# Patient Record
Sex: FEMALE | Race: WHITE | NOT HISPANIC OR LATINO | Employment: FULL TIME | URBAN - METROPOLITAN AREA
[De-identification: names, ages, dates, MRNs, and addresses within clinical notes are randomized per-mention and may not be internally consistent; named-entity substitution may affect disease eponyms.]

---

## 2023-02-20 ENCOUNTER — TRANSCRIBE ORDERS (OUTPATIENT)
Dept: LAB | Facility: HOSPITAL | Age: 53
End: 2023-02-20
Payer: COMMERCIAL

## 2023-02-20 ENCOUNTER — LAB (OUTPATIENT)
Dept: LAB | Facility: HOSPITAL | Age: 53
End: 2023-02-20
Payer: COMMERCIAL

## 2023-02-20 DIAGNOSIS — N92.4 PREMENOPAUSAL MENORRHAGIA: Primary | ICD-10-CM

## 2023-02-20 DIAGNOSIS — N92.4 PREMENOPAUSAL MENORRHAGIA: ICD-10-CM

## 2023-02-20 PROCEDURE — 85027 COMPLETE CBC AUTOMATED: CPT

## 2023-02-20 PROCEDURE — 84439 ASSAY OF FREE THYROXINE: CPT

## 2023-02-20 PROCEDURE — 36415 COLL VENOUS BLD VENIPUNCTURE: CPT

## 2023-02-20 PROCEDURE — 84443 ASSAY THYROID STIM HORMONE: CPT

## 2023-02-21 LAB
DEPRECATED RDW RBC AUTO: 47.8 FL (ref 37–54)
ERYTHROCYTE [DISTWIDTH] IN BLOOD BY AUTOMATED COUNT: 14.4 % (ref 12.3–15.4)
HCT VFR BLD AUTO: 38.5 % (ref 34–46.6)
HGB BLD-MCNC: 12.8 G/DL (ref 12–15.9)
MCH RBC QN AUTO: 30.5 PG (ref 26.6–33)
MCHC RBC AUTO-ENTMCNC: 33.2 G/DL (ref 31.5–35.7)
MCV RBC AUTO: 91.9 FL (ref 79–97)
PLATELET # BLD AUTO: 293 10*3/MM3 (ref 140–450)
PMV BLD AUTO: 10.6 FL (ref 6–12)
RBC # BLD AUTO: 4.19 10*6/MM3 (ref 3.77–5.28)
T4 FREE SERPL-MCNC: 1.09 NG/DL (ref 0.93–1.7)
TSH SERPL DL<=0.05 MIU/L-ACNC: 8.78 UIU/ML (ref 0.27–4.2)
WBC NRBC COR # BLD: 5.29 10*3/MM3 (ref 3.4–10.8)

## 2024-01-31 ENCOUNTER — PREP FOR SURGERY (OUTPATIENT)
Dept: OTHER | Facility: HOSPITAL | Age: 54
End: 2024-01-31
Payer: COMMERCIAL

## 2024-01-31 ENCOUNTER — PRE-ADMISSION TESTING (OUTPATIENT)
Dept: PREADMISSION TESTING | Facility: HOSPITAL | Age: 54
End: 2024-01-31
Payer: COMMERCIAL

## 2024-01-31 VITALS — BODY MASS INDEX: 30.81 KG/M2 | HEIGHT: 64 IN | WEIGHT: 180.45 LBS

## 2024-01-31 DIAGNOSIS — N39.3 SUI (STRESS URINARY INCONTINENCE, FEMALE): ICD-10-CM

## 2024-01-31 DIAGNOSIS — N39.3 SUI (STRESS URINARY INCONTINENCE, FEMALE): Primary | ICD-10-CM

## 2024-01-31 LAB
BASOPHILS # BLD AUTO: 0.03 10*3/MM3 (ref 0–0.2)
BASOPHILS NFR BLD AUTO: 0.5 % (ref 0–1.5)
BILIRUB UR QL STRIP: NEGATIVE
CLARITY UR: CLEAR
COLOR UR: YELLOW
DEPRECATED RDW RBC AUTO: 50.1 FL (ref 37–54)
EOSINOPHIL # BLD AUTO: 0.13 10*3/MM3 (ref 0–0.4)
EOSINOPHIL NFR BLD AUTO: 2.4 % (ref 0.3–6.2)
ERYTHROCYTE [DISTWIDTH] IN BLOOD BY AUTOMATED COUNT: 14.3 % (ref 12.3–15.4)
GLUCOSE UR STRIP-MCNC: NEGATIVE MG/DL
HCT VFR BLD AUTO: 41.1 % (ref 34–46.6)
HGB BLD-MCNC: 13.5 G/DL (ref 12–15.9)
HGB UR QL STRIP.AUTO: NEGATIVE
IMM GRANULOCYTES # BLD AUTO: 0.01 10*3/MM3 (ref 0–0.05)
IMM GRANULOCYTES NFR BLD AUTO: 0.2 % (ref 0–0.5)
KETONES UR QL STRIP: NEGATIVE
LEUKOCYTE ESTERASE UR QL STRIP.AUTO: NEGATIVE
LYMPHOCYTES # BLD AUTO: 1.44 10*3/MM3 (ref 0.7–3.1)
LYMPHOCYTES NFR BLD AUTO: 26.1 % (ref 19.6–45.3)
MCH RBC QN AUTO: 31.8 PG (ref 26.6–33)
MCHC RBC AUTO-ENTMCNC: 32.8 G/DL (ref 31.5–35.7)
MCV RBC AUTO: 96.7 FL (ref 79–97)
MONOCYTES # BLD AUTO: 0.42 10*3/MM3 (ref 0.1–0.9)
MONOCYTES NFR BLD AUTO: 7.6 % (ref 5–12)
NEUTROPHILS NFR BLD AUTO: 3.49 10*3/MM3 (ref 1.7–7)
NEUTROPHILS NFR BLD AUTO: 63.2 % (ref 42.7–76)
NITRITE UR QL STRIP: NEGATIVE
NRBC BLD AUTO-RTO: 0 /100 WBC (ref 0–0.2)
PH UR STRIP.AUTO: 6.5 [PH] (ref 5–8)
PLATELET # BLD AUTO: 272 10*3/MM3 (ref 140–450)
PMV BLD AUTO: 10 FL (ref 6–12)
POTASSIUM SERPL-SCNC: 4.7 MMOL/L (ref 3.5–5.2)
PROT UR QL STRIP: NEGATIVE
RBC # BLD AUTO: 4.25 10*6/MM3 (ref 3.77–5.28)
SP GR UR STRIP: 1.01 (ref 1–1.03)
UROBILINOGEN UR QL STRIP: NORMAL
WBC NRBC COR # BLD AUTO: 5.52 10*3/MM3 (ref 3.4–10.8)

## 2024-01-31 PROCEDURE — 84132 ASSAY OF SERUM POTASSIUM: CPT

## 2024-01-31 PROCEDURE — 36415 COLL VENOUS BLD VENIPUNCTURE: CPT

## 2024-01-31 PROCEDURE — 85025 COMPLETE CBC W/AUTO DIFF WBC: CPT

## 2024-01-31 PROCEDURE — 81003 URINALYSIS AUTO W/O SCOPE: CPT

## 2024-01-31 RX ORDER — SODIUM CHLORIDE 9 MG/ML
40 INJECTION, SOLUTION INTRAVENOUS AS NEEDED
OUTPATIENT
Start: 2024-01-31

## 2024-01-31 RX ORDER — FOLIC ACID 1 MG/1
4-5 TABLET ORAL DAILY
COMMUNITY

## 2024-01-31 RX ORDER — SPIRONOLACTONE 100 MG/1
100 TABLET, FILM COATED ORAL DAILY
COMMUNITY

## 2024-01-31 RX ORDER — LEVOCETIRIZINE DIHYDROCHLORIDE 5 MG/1
5 TABLET, FILM COATED ORAL EVERY EVENING
COMMUNITY

## 2024-01-31 RX ORDER — ACETAMINOPHEN 500 MG
1000 TABLET ORAL ONCE
OUTPATIENT
Start: 2024-01-31 | End: 2024-01-31

## 2024-01-31 RX ORDER — SODIUM CHLORIDE 0.9 % (FLUSH) 0.9 %
3 SYRINGE (ML) INJECTION EVERY 12 HOURS SCHEDULED
OUTPATIENT
Start: 2024-01-31

## 2024-01-31 RX ORDER — METHOTREXATE 2.5 MG/1
TABLET ORAL WEEKLY
COMMUNITY

## 2024-01-31 RX ORDER — BISACODYL 10 MG
10 SUPPOSITORY, RECTAL RECTAL DAILY PRN
COMMUNITY

## 2024-01-31 RX ORDER — LEVOTHYROXINE SODIUM 0.03 MG/1
25 TABLET ORAL
COMMUNITY

## 2024-01-31 RX ORDER — GABAPENTIN 300 MG/1
600 CAPSULE ORAL ONCE
OUTPATIENT
Start: 2024-01-31 | End: 2024-01-31

## 2024-01-31 RX ORDER — HEPARIN SODIUM 5000 [USP'U]/ML
5000 INJECTION, SOLUTION INTRAVENOUS; SUBCUTANEOUS ONCE
OUTPATIENT
Start: 2024-01-31 | End: 2024-01-31

## 2024-01-31 RX ORDER — SODIUM CHLORIDE 0.9 % (FLUSH) 0.9 %
10 SYRINGE (ML) INJECTION AS NEEDED
OUTPATIENT
Start: 2024-01-31

## 2024-01-31 NOTE — PAT
Patient viewed general PAT education video as instructed in their preoperative information received from their surgeon.  Patient stated the general PAT education video was viewed in its entirety and survey completed.  Copies of [facility] general education handouts (Incentive Spirometry, Meds to Beds Program, Patient Belongings, Pre-op skin preparation instructions, Blood Glucose testing, Visitor policy, Surgery FAQ, Code H) distributed to patient if not printed. Education related to the PAT pass and skin preparation for surgery (if applicable) completed in PAT as a reinforcement to PAT education video. Patient instructed to return PAT pass provided today as well as completed skin preparation sheet (if applicable) on the day of procedure.     Additionally if patient had not viewed video yet but intended to view it at home or in our waiting area, then referred them to the handout with QR code/link provided during PAT visit.  Instructed patient to complete survey after viewing the video in its entirety.  Encouraged patient/family to read [facility] general education handouts thoroughly and notify PAT staff with any questions or concerns. Patient verbalized understanding of all information and priority content.    Patient to apply Chlorhexadine wipes  to surgical area (as instructed) the night before procedure and the AM of procedure. Wipes provided.    Patient instructed to drink 20 ounces of Gatorade or Gatorlyte (if diabetic) and it needs to be completed 1 hour (for Main OR patients) or 2 hours (scheduled  section & [facility] patients) before given arrival time for procedure (NO RED Gatorade and NO Gatorade Zero).    Patient verbalized understanding.    Instructed patient to take two Tylenol extra strength (total of 1000 mg) the night before surgery.

## 2024-02-07 ENCOUNTER — ANESTHESIA EVENT (OUTPATIENT)
Dept: PERIOP | Facility: HOSPITAL | Age: 54
End: 2024-02-07
Payer: COMMERCIAL

## 2024-02-07 RX ORDER — FAMOTIDINE 10 MG/ML
20 INJECTION, SOLUTION INTRAVENOUS ONCE
Status: CANCELLED | OUTPATIENT
Start: 2024-02-07 | End: 2024-02-07

## 2024-02-07 RX ORDER — SODIUM CHLORIDE 9 MG/ML
40 INJECTION, SOLUTION INTRAVENOUS AS NEEDED
Status: CANCELLED | OUTPATIENT
Start: 2024-02-07

## 2024-02-07 RX ORDER — SODIUM CHLORIDE 0.9 % (FLUSH) 0.9 %
10 SYRINGE (ML) INJECTION AS NEEDED
Status: CANCELLED | OUTPATIENT
Start: 2024-02-07

## 2024-02-08 ENCOUNTER — ANESTHESIA (OUTPATIENT)
Dept: PERIOP | Facility: HOSPITAL | Age: 54
End: 2024-02-08
Payer: COMMERCIAL

## 2024-02-08 ENCOUNTER — HOSPITAL ENCOUNTER (OUTPATIENT)
Facility: HOSPITAL | Age: 54
Discharge: HOME | End: 2024-02-08
Attending: OBSTETRICS & GYNECOLOGY | Admitting: OBSTETRICS & GYNECOLOGY
Payer: COMMERCIAL

## 2024-02-08 VITALS
HEART RATE: 82 BPM | BODY MASS INDEX: 30.81 KG/M2 | TEMPERATURE: 97.9 F | HEIGHT: 64 IN | OXYGEN SATURATION: 95 % | SYSTOLIC BLOOD PRESSURE: 118 MMHG | WEIGHT: 180.45 LBS | RESPIRATION RATE: 14 BRPM | DIASTOLIC BLOOD PRESSURE: 75 MMHG

## 2024-02-08 DIAGNOSIS — N39.3 SUI (STRESS URINARY INCONTINENCE, FEMALE): ICD-10-CM

## 2024-02-08 LAB
B-HCG UR QL: NEGATIVE
EXPIRATION DATE: NORMAL
INTERNAL NEGATIVE CONTROL: NEGATIVE
INTERNAL POSITIVE CONTROL: POSITIVE
Lab: NORMAL
POTASSIUM SERPL-SCNC: 4.8 MMOL/L (ref 3.5–5.2)

## 2024-02-08 PROCEDURE — 25810000003 LACTATED RINGERS PER 1000 ML: Performed by: ANESTHESIOLOGY

## 2024-02-08 PROCEDURE — 81025 URINE PREGNANCY TEST: CPT | Performed by: ANESTHESIOLOGY

## 2024-02-08 PROCEDURE — 25010000002 HEPARIN (PORCINE) PER 1000 UNITS: Performed by: OBSTETRICS & GYNECOLOGY

## 2024-02-08 PROCEDURE — 25010000002 PROPOFOL 10 MG/ML EMULSION

## 2024-02-08 PROCEDURE — 25010000002 ESMOLOL 100 MG/10ML SOLUTION: Performed by: ANESTHESIOLOGY

## 2024-02-08 PROCEDURE — 25010000002 ONDANSETRON PER 1 MG: Performed by: ANESTHESIOLOGY

## 2024-02-08 PROCEDURE — C1771 REP DEV, URINARY, W/SLING: HCPCS | Performed by: OBSTETRICS & GYNECOLOGY

## 2024-02-08 PROCEDURE — 25010000002 FENTANYL CITRATE (PF) 100 MCG/2ML SOLUTION

## 2024-02-08 PROCEDURE — 25010000002 DEXAMETHASONE PER 1 MG: Performed by: ANESTHESIOLOGY

## 2024-02-08 PROCEDURE — 84132 ASSAY OF SERUM POTASSIUM: CPT | Performed by: OBSTETRICS & GYNECOLOGY

## 2024-02-08 PROCEDURE — G0378 HOSPITAL OBSERVATION PER HR: HCPCS

## 2024-02-08 PROCEDURE — 25010000002 CEFAZOLIN PER 500 MG: Performed by: OBSTETRICS & GYNECOLOGY

## 2024-02-08 PROCEDURE — 25810000003 SODIUM CHLORIDE PER 500 ML: Performed by: OBSTETRICS & GYNECOLOGY

## 2024-02-08 DEVICE — IMPLANTABLE DEVICE: Type: IMPLANTABLE DEVICE | Site: VAGINA | Status: FUNCTIONAL

## 2024-02-08 RX ORDER — DEXAMETHASONE SODIUM PHOSPHATE 4 MG/ML
INJECTION, SOLUTION INTRA-ARTICULAR; INTRALESIONAL; INTRAMUSCULAR; INTRAVENOUS; SOFT TISSUE AS NEEDED
Status: DISCONTINUED | OUTPATIENT
Start: 2024-02-08 | End: 2024-02-08 | Stop reason: SURG

## 2024-02-08 RX ORDER — HEPARIN SODIUM 5000 [USP'U]/ML
5000 INJECTION, SOLUTION INTRAVENOUS; SUBCUTANEOUS ONCE
Status: DISCONTINUED | OUTPATIENT
Start: 2024-02-08 | End: 2024-02-08 | Stop reason: HOSPADM

## 2024-02-08 RX ORDER — LIDOCAINE HYDROCHLORIDE 10 MG/ML
0.5 INJECTION, SOLUTION EPIDURAL; INFILTRATION; INTRACAUDAL; PERINEURAL ONCE AS NEEDED
Status: COMPLETED | OUTPATIENT
Start: 2024-02-08 | End: 2024-02-08

## 2024-02-08 RX ORDER — SODIUM CHLORIDE 0.9 % (FLUSH) 0.9 %
10 SYRINGE (ML) INJECTION EVERY 12 HOURS SCHEDULED
Status: DISCONTINUED | OUTPATIENT
Start: 2024-02-08 | End: 2024-02-08 | Stop reason: HOSPADM

## 2024-02-08 RX ORDER — SODIUM CHLORIDE, SODIUM LACTATE, POTASSIUM CHLORIDE, CALCIUM CHLORIDE 600; 310; 30; 20 MG/100ML; MG/100ML; MG/100ML; MG/100ML
9 INJECTION, SOLUTION INTRAVENOUS CONTINUOUS
Status: DISCONTINUED | OUTPATIENT
Start: 2024-02-08 | End: 2024-02-08 | Stop reason: HOSPADM

## 2024-02-08 RX ORDER — HYDROMORPHONE HYDROCHLORIDE 1 MG/ML
0.5 INJECTION, SOLUTION INTRAMUSCULAR; INTRAVENOUS; SUBCUTANEOUS
Status: DISCONTINUED | OUTPATIENT
Start: 2024-02-08 | End: 2024-02-08 | Stop reason: HOSPADM

## 2024-02-08 RX ORDER — MIDAZOLAM HYDROCHLORIDE 1 MG/ML
1 INJECTION INTRAMUSCULAR; INTRAVENOUS
Status: DISCONTINUED | OUTPATIENT
Start: 2024-02-08 | End: 2024-02-08 | Stop reason: HOSPADM

## 2024-02-08 RX ORDER — HEPARIN SODIUM 5000 [USP'U]/ML
INJECTION, SOLUTION INTRAVENOUS; SUBCUTANEOUS AS NEEDED
Status: DISCONTINUED | OUTPATIENT
Start: 2024-02-08 | End: 2024-02-08 | Stop reason: HOSPADM

## 2024-02-08 RX ORDER — ONDANSETRON 2 MG/ML
4 INJECTION INTRAMUSCULAR; INTRAVENOUS ONCE AS NEEDED
Status: DISCONTINUED | OUTPATIENT
Start: 2024-02-08 | End: 2024-02-08 | Stop reason: HOSPADM

## 2024-02-08 RX ORDER — ONDANSETRON 2 MG/ML
INJECTION INTRAMUSCULAR; INTRAVENOUS AS NEEDED
Status: DISCONTINUED | OUTPATIENT
Start: 2024-02-08 | End: 2024-02-08 | Stop reason: SURG

## 2024-02-08 RX ORDER — LIDOCAINE HYDROCHLORIDE 10 MG/ML
INJECTION, SOLUTION EPIDURAL; INFILTRATION; INTRACAUDAL; PERINEURAL AS NEEDED
Status: DISCONTINUED | OUTPATIENT
Start: 2024-02-08 | End: 2024-02-08 | Stop reason: SURG

## 2024-02-08 RX ORDER — GABAPENTIN 300 MG/1
600 CAPSULE ORAL ONCE
Status: COMPLETED | OUTPATIENT
Start: 2024-02-08 | End: 2024-02-08

## 2024-02-08 RX ORDER — FENTANYL CITRATE 50 UG/ML
INJECTION, SOLUTION INTRAMUSCULAR; INTRAVENOUS AS NEEDED
Status: DISCONTINUED | OUTPATIENT
Start: 2024-02-08 | End: 2024-02-08 | Stop reason: SURG

## 2024-02-08 RX ORDER — FENTANYL CITRATE 50 UG/ML
50 INJECTION, SOLUTION INTRAMUSCULAR; INTRAVENOUS
Status: DISCONTINUED | OUTPATIENT
Start: 2024-02-08 | End: 2024-02-08 | Stop reason: HOSPADM

## 2024-02-08 RX ORDER — SODIUM CHLORIDE 9 MG/ML
INJECTION, SOLUTION INTRAVENOUS AS NEEDED
Status: DISCONTINUED | OUTPATIENT
Start: 2024-02-08 | End: 2024-02-08 | Stop reason: HOSPADM

## 2024-02-08 RX ORDER — PROPOFOL 10 MG/ML
VIAL (ML) INTRAVENOUS AS NEEDED
Status: DISCONTINUED | OUTPATIENT
Start: 2024-02-08 | End: 2024-02-08 | Stop reason: SURG

## 2024-02-08 RX ORDER — FAMOTIDINE 20 MG/1
20 TABLET, FILM COATED ORAL ONCE
Status: COMPLETED | OUTPATIENT
Start: 2024-02-08 | End: 2024-02-08

## 2024-02-08 RX ORDER — MELOXICAM 15 MG/1
7.5 TABLET ORAL ONCE AS NEEDED
Status: DISCONTINUED | OUTPATIENT
Start: 2024-02-08 | End: 2024-02-08 | Stop reason: HOSPADM

## 2024-02-08 RX ORDER — ESMOLOL HYDROCHLORIDE 10 MG/ML
INJECTION INTRAVENOUS AS NEEDED
Status: DISCONTINUED | OUTPATIENT
Start: 2024-02-08 | End: 2024-02-08 | Stop reason: SURG

## 2024-02-08 RX ORDER — ACETAMINOPHEN 325 MG/1
650 TABLET ORAL ONCE
Status: DISCONTINUED | OUTPATIENT
Start: 2024-02-08 | End: 2024-02-08 | Stop reason: HOSPADM

## 2024-02-08 RX ORDER — ACETAMINOPHEN 500 MG
1000 TABLET ORAL ONCE
Status: COMPLETED | OUTPATIENT
Start: 2024-02-08 | End: 2024-02-08

## 2024-02-08 RX ADMIN — ACETAMINOPHEN 1000 MG: 500 TABLET ORAL at 11:50

## 2024-02-08 RX ADMIN — ONDANSETRON 4 MG: 2 INJECTION INTRAMUSCULAR; INTRAVENOUS at 13:35

## 2024-02-08 RX ADMIN — FAMOTIDINE 20 MG: 20 TABLET, FILM COATED ORAL at 11:50

## 2024-02-08 RX ADMIN — FENTANYL CITRATE 50 MCG: 50 INJECTION, SOLUTION INTRAMUSCULAR; INTRAVENOUS at 13:14

## 2024-02-08 RX ADMIN — LIDOCAINE HYDROCHLORIDE 50 MG: 10 INJECTION, SOLUTION EPIDURAL; INFILTRATION; INTRACAUDAL; PERINEURAL at 13:14

## 2024-02-08 RX ADMIN — SODIUM CHLORIDE, POTASSIUM CHLORIDE, SODIUM LACTATE AND CALCIUM CHLORIDE 9 ML/HR: 600; 310; 30; 20 INJECTION, SOLUTION INTRAVENOUS at 11:50

## 2024-02-08 RX ADMIN — PROPOFOL 30 MG: 10 INJECTION, EMULSION INTRAVENOUS at 13:22

## 2024-02-08 RX ADMIN — LIDOCAINE HYDROCHLORIDE 0.5 ML: 10 INJECTION, SOLUTION EPIDURAL; INFILTRATION; INTRACAUDAL; PERINEURAL at 11:50

## 2024-02-08 RX ADMIN — DEXAMETHASONE SODIUM PHOSPHATE 8 MG: 4 INJECTION, SOLUTION INTRAMUSCULAR; INTRAVENOUS at 13:24

## 2024-02-08 RX ADMIN — SODIUM CHLORIDE 2000 MG: 900 INJECTION INTRAVENOUS at 13:21

## 2024-02-08 RX ADMIN — GABAPENTIN 600 MG: 300 CAPSULE ORAL at 11:50

## 2024-02-08 RX ADMIN — PROPOFOL 200 MG: 10 INJECTION, EMULSION INTRAVENOUS at 13:14

## 2024-02-08 RX ADMIN — FENTANYL CITRATE 50 MCG: 50 INJECTION, SOLUTION INTRAMUSCULAR; INTRAVENOUS at 13:20

## 2024-02-08 RX ADMIN — ESMOLOL HYDROCHLORIDE 30 MG: 10 INJECTION, SOLUTION INTRAVENOUS at 13:19

## 2024-02-08 NOTE — OP NOTE
Operative Procedure Report    Pre-operative Diagnosis: GSUI    Post-operative Diagnosis: GSUI    Operation: Midurethral Sling, Cystoscopy     Surgeon: Melanie Burgos MD     Assistants: none    Anesthesia: General endotracheal anesthesia    Findings: Hypermobile urethra    Estimated Blood Loss: Minimal      Specimens: None    Implants: midurethral sling    Complications:  None; patient tolerated the procedure well.    Disposition: PACU - hemodynamically stable.    Condition: stable      Procedure Details    The patient was seen in the Holding Room. The risks, benefits, complications, treatment options, and expected outcomes were discussed with the patient. The patient concurred with the proposed plan, giving informed consent. The patient wad identified as Yue Jernigan and the procedure verified as Midurethral Sling with Cystoscopy. A Time Out was held and the above information confirmed.  After induction of anesthesia, the patient was draped and prepped in the usual sterile manner. The vaginal epithelium over the proximal and distal ends of the urethra was identified and grasped with allis clamps. 10ml of dilute marcaine solution was injected in the epithelium for hydro dissection. A 1-2 cm vertical midline incision was performed and the vaginal epithelium was dissected away from the urethra and bladder. The sling was then inserted and secured to the left obturator fascia. It was then reloaded and inserted to the right obturator fascia under appropriate tensioning. The overlying vaginal epithelium was closed with 3-0 vicryl. Diagnostic cystoscopy was then performed. Bladder and urethral integrity were confirmed. The counts were correct x 2 and the patient was awakened on the table. She was taken to RR in stable condition.       Melanie Burgos MD  02/08/24  13:44 EST

## 2024-02-08 NOTE — ANESTHESIA PREPROCEDURE EVALUATION
Anesthesia Evaluation     Patient summary reviewed and Nursing notes reviewed                Airway   Mallampati: II  TM distance: >3 FB  Neck ROM: full  No difficulty expected  Dental - normal exam     Pulmonary - negative pulmonary ROS and normal exam   Cardiovascular - negative cardio ROS and normal exam        Neuro/Psych- negative ROS  GI/Hepatic/Renal/Endo - negative ROS   (+) obesity, thyroid problem hypothyroidism    Musculoskeletal (-) negative ROS    Abdominal  - normal exam    Bowel sounds: normal.   Substance History - negative use     OB/GYN negative ob/gyn ROS         Other                      Anesthesia Plan    ASA 2     general     intravenous induction     Anesthetic plan, risks, benefits, and alternatives have been provided, discussed and informed consent has been obtained with: patient.    Plan discussed with CRNA.    CODE STATUS:

## 2024-02-08 NOTE — INTERVAL H&P NOTE
"[facility] Pre-op    Full history and physical note from office is attached.    /83 (BP Location: Right arm)   Pulse 97   Temp 97.2 °F (36.2 °C) (Temporal)   Resp 18   Ht 162.6 cm (64\")   Wt 81.8 kg (180 lb 7.1 oz)   SpO2 99%   BMI 30.97 kg/m²     Review of Systems:  Constitutional-- No fever, chills or sweats. No fatigue.  CV-- No chest pain, palpitation or syncope  Resp-- No SOB, cough, hemoptysis  Skin--No rashes or lesions    LAB Results:  Lab Results   Component Value Date    WBC 5.52 01/31/2024    HGB 13.5 01/31/2024    HCT 41.1 01/31/2024    MCV 96.7 01/31/2024     01/31/2024    NEUTROABS 3.49 01/31/2024    K 4.7 01/31/2024       Cancer Staging (if applicable)  Cancer Patient: __ yes __no __unknown__N/A; If yes, clinical stage T:__ N:__M:__, stage group or __N/A      Impression: stress urinary incontinence       Plan: MID-URETHRAL SLING       Mag Gaines, BREANNA   2/8/2024   11:45 EST  "

## 2024-02-08 NOTE — ANESTHESIA POSTPROCEDURE EVALUATION
Patient: Yue Jernigan    Procedure Summary       Date: 02/08/24 Room / Location:  [facility] OR  /  [facility]    Anesthesia Start: 1311 Anesthesia Stop: 1359    Procedure: MID-URETHRAL SLING (Vagina) Diagnosis:     Surgeons: Melanie Burgos MD Provider: Solomon Coreas MD    Anesthesia Type: general ASA Status: 2            Anesthesia Type: general    Vitals  Vitals Value Taken Time   /75 02/08/24 1530   Temp 97.9 °F (36.6 °C) 02/08/24 1530   Pulse 88 02/08/24 1538   Resp 14 02/08/24 1530   SpO2 94 % 02/08/24 1538   Vitals shown include unfiled device data.        Post Anesthesia Care and Evaluation    Patient location during evaluation: PACU  Patient participation: complete - patient participated  Level of consciousness: awake and alert  Pain management: adequate    Airway patency: patent  Anesthetic complications: No anesthetic complications  PONV Status: none  Cardiovascular status: hemodynamically stable and acceptable  Respiratory status: nonlabored ventilation, acceptable and nasal cannula  Hydration status: acceptable

## 2024-02-08 NOTE — ANESTHESIA PROCEDURE NOTES
Airway  Urgency: elective    Date/Time: 2/8/2024 1:17 PM  Airway not difficult    General Information and Staff    Patient location during procedure: OR    Indications and Patient Condition  Indications for airway management: airway protection    Preoxygenated: yes  Mask difficulty assessment: 1 - vent by mask    Final Airway Details  Final airway type: supraglottic airway      Successful airway: I-gel  Size 4     Number of attempts at approach: 1  Assessment: lips, teeth, and gum same as pre-op    Additional Comments  LMA placed without difficulty, ventilation with assist, equal breath sounds and symmetric chest rise and fall          
17-Oct-2021

## 2024-02-08 NOTE — ANESTHESIA POSTPROCEDURE EVALUATION
Patient: Yue Jernigan    Procedure Summary       Date: 02/08/24 Room / Location:  [facility]  /  [facility]    Anesthesia Start: 1311 Anesthesia Stop: 1359    Procedure: MID-URETHRAL SLING (Vagina) Diagnosis:     Surgeons: Melanie Burgos MD Provider: Solomon Coreas MD    Anesthesia Type: general ASA Status: 2            Anesthesia Type: general    Vitals  No vitals data found for the desired time range.          Post Anesthesia Care and Evaluation    Patient location during evaluation: PACU  Patient participation: complete - patient participated  Level of consciousness: awake and alert  Pain score: 0  Pain management: adequate    Airway patency: patent  Anesthetic complications: No anesthetic complications  PONV Status: none  Cardiovascular status: hemodynamically stable and acceptable  Respiratory status: nonlabored ventilation, acceptable and nasal cannula  Hydration status: acceptable

## 2024-02-12 NOTE — DISCHARGE SUMMARY
[facility]  Post Operative Discharge Summary      Patient: Yue Jernigan        MR#:[id number]    Admission  Diagnosis: Urinary, incontinence, stress female  Discharge Diagnosis:   Urinary, incontinence, stress female       Date of Admission: 2/8/2024  Date of Discharge:  2/8/2024     Procedures:  midurethral sling     Service:  Gynecology    Hospital Course:  Patient underwent  and remained in the hospital for 0 days.  During that time she remained afebrile and hemodynamically stable.  On the day of discharge, she was eating, ambulating and voiding without difficulty.      Labs:    Lab Results (last 24 hours)       Procedure Component Value Units Date/Time    Potassium [[id number]]  (Normal) Collected: 02/08/24 1241    Specimen: Blood Updated: 02/08/24 1257     Potassium 4.8 mmol/L     POC Urine Pregnancy [[id number]] Collected: 02/08/24 1154    Specimen: Urine Updated: 02/08/24 1155     HCG, Urine, QL Negative     Lot Number [id number]     Internal Positive Control Positive     Internal Negative Control Negative     Expiration Date 5-2-25            Discharge Medications     Discharge Medications        Continue These Medications        Instructions Start Date   bisacodyl 10 MG suppository  Commonly known as: DULCOLAX   10 mg, Rectal, Daily PRN      folic acid 1 MG tablet  Commonly known as: FOLVITE   4-5 mg, Oral, Daily, TAKE 4-5 TABLETS EVERYDAY EXCEPT THE DAY OF METHOTREXATE (SUNDAY)       levocetirizine 5 MG tablet  Commonly known as: XYZAL   5 mg, Oral, Every Evening      levothyroxine 25 MCG tablet  Commonly known as: SYNTHROID, LEVOTHROID   25 mcg, Oral, Every Early Morning      linaclotide 290 MCG capsule capsule  Commonly known as: LINZESS   290 mcg, Oral, Daily PRN      methotrexate 2.5 MG tablet   Oral, Weekly, 8 TABLETS ON SUNDAYS      spironolactone 100 MG tablet  Commonly known as: ALDACTONE   100 mg, Oral, Daily, FOR ACNE               Discharge Disposition:  To Home    Discharge Condition:   Stable    Discharge Diet: regular    Activity at Discharge: pelvic rest    Follow-up Appointments  4 weeks    Melanie Burgos MD  02/12/24  08:57 EST

## 2024-07-12 ENCOUNTER — LAB (OUTPATIENT)
Facility: HOSPITAL | Age: 54
End: 2024-07-12
Payer: COMMERCIAL

## 2024-07-12 ENCOUNTER — OFFICE VISIT (OUTPATIENT)
Age: 54
End: 2024-07-12
Payer: COMMERCIAL

## 2024-07-12 VITALS
HEART RATE: 100 BPM | WEIGHT: 181.5 LBS | HEIGHT: 64 IN | SYSTOLIC BLOOD PRESSURE: 120 MMHG | BODY MASS INDEX: 30.99 KG/M2 | TEMPERATURE: 98 F | DIASTOLIC BLOOD PRESSURE: 70 MMHG

## 2024-07-12 DIAGNOSIS — L40.50 PSORIATIC ARTHRITIS: Primary | Chronic | ICD-10-CM

## 2024-07-12 DIAGNOSIS — M15.9 PRIMARY OSTEOARTHRITIS INVOLVING MULTIPLE JOINTS: Chronic | ICD-10-CM

## 2024-07-12 DIAGNOSIS — Z79.899 IMMUNODEFICIENCY DUE TO DRUG THERAPY: Chronic | ICD-10-CM

## 2024-07-12 DIAGNOSIS — L40.50 PSORIATIC ARTHRITIS: Chronic | ICD-10-CM

## 2024-07-12 DIAGNOSIS — D84.821 IMMUNODEFICIENCY DUE TO DRUG THERAPY: Chronic | ICD-10-CM

## 2024-07-12 DIAGNOSIS — Z79.899 HIGH RISK MEDICATION USE: Chronic | ICD-10-CM

## 2024-07-12 PROBLEM — M15.0 PRIMARY OSTEOARTHRITIS INVOLVING MULTIPLE JOINTS: Chronic | Status: ACTIVE | Noted: 2024-07-12

## 2024-07-12 PROBLEM — E03.9 HYPOTHYROIDISM: Status: ACTIVE | Noted: 2024-07-12

## 2024-07-12 PROBLEM — M54.9 BACK PAIN: Status: ACTIVE | Noted: 2024-07-12

## 2024-07-12 PROBLEM — L40.9 PSORIASIS: Status: ACTIVE | Noted: 2024-07-12

## 2024-07-12 PROBLEM — Z79.631 METHOTREXATE, LONG TERM, CURRENT USE: Status: ACTIVE | Noted: 2024-07-12

## 2024-07-12 LAB
ALBUMIN SERPL-MCNC: 4.1 G/DL (ref 3.5–5.2)
ALBUMIN/GLOB SERPL: 1.7 G/DL
ALP SERPL-CCNC: 127 U/L (ref 39–117)
ALT SERPL W P-5'-P-CCNC: 14 U/L (ref 1–33)
ANION GAP SERPL CALCULATED.3IONS-SCNC: 10.2 MMOL/L (ref 5–15)
AST SERPL-CCNC: 20 U/L (ref 1–32)
BASOPHILS # BLD AUTO: 0.05 10*3/MM3 (ref 0–0.2)
BASOPHILS NFR BLD AUTO: 0.8 % (ref 0–1.5)
BILIRUB SERPL-MCNC: 0.4 MG/DL (ref 0–1.2)
BUN SERPL-MCNC: 14 MG/DL (ref 6–20)
BUN/CREAT SERPL: 18.2 (ref 7–25)
CALCIUM SPEC-SCNC: 9.2 MG/DL (ref 8.6–10.5)
CHLORIDE SERPL-SCNC: 103 MMOL/L (ref 98–107)
CO2 SERPL-SCNC: 24.8 MMOL/L (ref 22–29)
CREAT SERPL-MCNC: 0.77 MG/DL (ref 0.57–1)
CRP SERPL-MCNC: <0.3 MG/DL (ref 0–0.5)
DEPRECATED RDW RBC AUTO: 46 FL (ref 37–54)
EGFRCR SERPLBLD CKD-EPI 2021: 91.2 ML/MIN/1.73
EOSINOPHIL # BLD AUTO: 0.12 10*3/MM3 (ref 0–0.4)
EOSINOPHIL NFR BLD AUTO: 1.8 % (ref 0.3–6.2)
ERYTHROCYTE [DISTWIDTH] IN BLOOD BY AUTOMATED COUNT: 14 % (ref 12.3–15.4)
ERYTHROCYTE [SEDIMENTATION RATE] IN BLOOD: 4 MM/HR (ref 0–30)
GLOBULIN UR ELPH-MCNC: 2.4 GM/DL
GLUCOSE SERPL-MCNC: 79 MG/DL (ref 65–99)
HCT VFR BLD AUTO: 39.9 % (ref 34–46.6)
HGB BLD-MCNC: 13.1 G/DL (ref 12–15.9)
IMM GRANULOCYTES # BLD AUTO: 0.02 10*3/MM3 (ref 0–0.05)
IMM GRANULOCYTES NFR BLD AUTO: 0.3 % (ref 0–0.5)
LYMPHOCYTES # BLD AUTO: 1.46 10*3/MM3 (ref 0.7–3.1)
LYMPHOCYTES NFR BLD AUTO: 22.1 % (ref 19.6–45.3)
MCH RBC QN AUTO: 30.5 PG (ref 26.6–33)
MCHC RBC AUTO-ENTMCNC: 32.8 G/DL (ref 31.5–35.7)
MCV RBC AUTO: 92.8 FL (ref 79–97)
MONOCYTES # BLD AUTO: 0.64 10*3/MM3 (ref 0.1–0.9)
MONOCYTES NFR BLD AUTO: 9.7 % (ref 5–12)
NEUTROPHILS NFR BLD AUTO: 4.32 10*3/MM3 (ref 1.7–7)
NEUTROPHILS NFR BLD AUTO: 65.3 % (ref 42.7–76)
NRBC BLD AUTO-RTO: 0 /100 WBC (ref 0–0.2)
PLATELET # BLD AUTO: 284 10*3/MM3 (ref 140–450)
PMV BLD AUTO: 10.6 FL (ref 6–12)
POTASSIUM SERPL-SCNC: 4 MMOL/L (ref 3.5–5.2)
PROT SERPL-MCNC: 6.5 G/DL (ref 6–8.5)
RBC # BLD AUTO: 4.3 10*6/MM3 (ref 3.77–5.28)
SODIUM SERPL-SCNC: 138 MMOL/L (ref 136–145)
WBC NRBC COR # BLD AUTO: 6.61 10*3/MM3 (ref 3.4–10.8)

## 2024-07-12 PROCEDURE — 80053 COMPREHEN METABOLIC PANEL: CPT

## 2024-07-12 PROCEDURE — 36415 COLL VENOUS BLD VENIPUNCTURE: CPT

## 2024-07-12 PROCEDURE — 85025 COMPLETE CBC W/AUTO DIFF WBC: CPT

## 2024-07-12 PROCEDURE — 85652 RBC SED RATE AUTOMATED: CPT

## 2024-07-12 PROCEDURE — 86140 C-REACTIVE PROTEIN: CPT

## 2024-07-12 RX ORDER — SECUKINUMAB 150 MG/ML
150 INJECTION SUBCUTANEOUS
Qty: 1.96 ML | Refills: 4 | Status: SHIPPED | OUTPATIENT
Start: 2024-07-12 | End: 2024-07-12

## 2024-07-12 RX ORDER — PANTOPRAZOLE SODIUM 40 MG/1
TABLET, DELAYED RELEASE ORAL
COMMUNITY

## 2024-07-12 RX ORDER — SECUKINUMAB 150 MG/ML
300 INJECTION SUBCUTANEOUS
Qty: 4 ML | Refills: 4 | Status: SHIPPED | OUTPATIENT
Start: 2024-07-12

## 2024-07-12 RX ORDER — ONDANSETRON 4 MG/1
TABLET, FILM COATED ORAL
COMMUNITY

## 2024-07-12 RX ORDER — FOLIC ACID 1 MG/1
4-5 TABLET ORAL DAILY
Qty: 150 TABLET | Refills: 3 | Status: SHIPPED | OUTPATIENT
Start: 2024-07-12

## 2024-07-12 RX ORDER — AZELASTINE HYDROCHLORIDE, FLUTICASONE PROPIONATE 137; 50 UG/1; UG/1
SPRAY, METERED NASAL AS NEEDED
COMMUNITY

## 2024-07-12 RX ORDER — MONTELUKAST SODIUM 10 MG/1
10 TABLET ORAL NIGHTLY PRN
COMMUNITY
Start: 2024-07-03

## 2024-07-12 RX ORDER — FLUCONAZOLE 150 MG/1
TABLET ORAL AS NEEDED
COMMUNITY

## 2024-07-12 RX ORDER — METHOTREXATE 2.5 MG/1
20 TABLET ORAL WEEKLY
Qty: 32 TABLET | Refills: 4 | Status: SHIPPED | OUTPATIENT
Start: 2024-07-12

## 2024-07-12 RX ORDER — EPINEPHRINE 0.3 MG/.3ML
INJECTION SUBCUTANEOUS
COMMUNITY

## 2024-07-12 RX ORDER — CLOBETASOL PROPIONATE 0.5 MG/G
1 OINTMENT TOPICAL 2 TIMES DAILY PRN
COMMUNITY

## 2024-07-12 NOTE — ASSESSMENT & PLAN NOTE
Methotrexate 20 mg PO once/week for psoriasis/psoriatic arthritis  1. CBC and CMP every 8-12 weeks to monitor for medication toxicity.   2. Take folate supplements daily.  3. No recent serious infections.  4. Refill today

## 2024-07-12 NOTE — PROGRESS NOTES
Office Follow Up      Date: 07/12/2024   Patient Name: Yue Jernigan  MRN: [id number]  YOB: 1970    Referring Physician: Referring, Self     Chief Complaint   Patient presents with    Psoriatic arthritis     Follow up    Osteoarthritis     Follow up       History of Present Illness: Yue Jernigan is a 54 y.o. female who is here today for follow up.     Today she rates her pain as 8/10 in severity. She has 3-5 minutes/day of morning stiffness. No red or hot joints. No swelling. No muscle pain or weakness. No back or neck problems.     No new rash/skin changes. No headaches or paresthesias. No abnormal bruising/bleeding. No lymphadenopathy. No abnormal bruising/bleeding. No GI or  issues. No chest pain. No shortness of breath. No sicca symptoms.       Subjective     Review of Systems   Constitutional: Negative.    HENT: Negative.     Eyes: Negative.    Respiratory: Negative.     Cardiovascular: Negative.    Gastrointestinal: Negative.    Endocrine: Negative.    Genitourinary: Negative.    Musculoskeletal: Negative.  Positive for arthralgias.   Skin: Negative.    Allergic/Immunologic: Negative.    Neurological: Negative.    Hematological: Negative.    Psychiatric/Behavioral: Negative.     All other systems reviewed and are negative.         Current Outpatient Medications:     Azelastine-Fluticasone 137-50 MCG/ACT suspension, into the nostril(s) as directed by provider As Needed., Disp: , Rfl:     bisacodyl (DULCOLAX) 10 MG suppository, Insert 1 suppository into the rectum Daily As Needed for Constipation., Disp: , Rfl:     clobetasol (TEMOVATE) 0.05 % ointment, Apply 1 Application topically to the appropriate area as directed 2 (Two) Times a Day As Needed., Disp: , Rfl:     Diclofenac Sodium (VOLTAREN) 1 % gel gel, Apply  topically to the appropriate area as directed 4 (Four) Times a Day As Needed (pain relief). APPLY (2G) TOPICAL AS DIRECTED, Disp: 300 g, Rfl: 4     EPINEPHrine (EPIPEN) 0.3 MG/0.3ML solution auto-injector injection, , Disp: , Rfl:     fluconazole (DIFLUCAN) 150 MG tablet, Take  by mouth As Needed., Disp: , Rfl:     folic acid (FOLVITE) 1 MG tablet, Take 4-5 tablets by mouth Daily. TAKE 4-5 TABLETS EVERYDAY EXCEPT THE DAY OF METHOTREXATE (SUNDAY), Disp: 150 tablet, Rfl: 3    ibuprofen (ADVIL,MOTRIN) 600 MG tablet, Take 1 tablet by mouth Every 6 (Six) Hours As Needed for Mild Pain., Disp: , Rfl:     levocetirizine (XYZAL) 5 MG tablet, Take 1 tablet by mouth Every Evening., Disp: , Rfl:     levothyroxine (SYNTHROID, LEVOTHROID) 25 MCG tablet, Take 1 tablet by mouth Every Morning., Disp: , Rfl:     linaclotide (LINZESS) 290 MCG capsule capsule, Take 1 capsule by mouth Daily As Needed., Disp: , Rfl:     methotrexate 2.5 MG tablet, Take 8 tablets by mouth 1 (One) Time Per Week. 8 TABLETS ON SUNDAYS, Disp: 32 tablet, Rfl: 4    montelukast (SINGULAIR) 10 MG tablet, Take 1 tablet by mouth At Night As Needed., Disp: , Rfl:     ondansetron (ZOFRAN) 4 MG tablet, 1 tablet po every 8 hours for 3 days As needed for post operative nausea, Disp: , Rfl:     pantoprazole (PROTONIX) 40 MG EC tablet, Take 1 tablet every day by oral route at bedtime for 30 days., Disp: , Rfl:     Secukinumab (Cosentyx Sensoready Pen) 150 MG/ML solution auto-injector, Inject 300 mg under the skin into the appropriate area as directed Every 30 (Thirty) Days. AS DIRECTED   EVERY 4 WEEKS, Disp: 4 mL, Rfl: 4    spironolactone (ALDACTONE) 100 MG tablet, Take 1 tablet by mouth Daily. FOR ACNE, Disp: , Rfl:     Allergies   Allergen Reactions    Shellfish-Derived Products Nausea Only    Ciprofloxacin Unknown - Low Severity     Pt states she does not respond to Cipro.        I have reviewed and updated the patient's chief complaint, history of present illness, review of systems, past medical history, surgical history, family history, social history, medications and allergy list as appropriate.     Objective   "    Vitals:    07/12/24 1401   BP: 120/70   BP Location: Left arm   Patient Position: Sitting   Cuff Size: Adult   Pulse: 100   Temp: 98 °F (36.7 °C)   Weight: 82.3 kg (181 lb 8 oz)   Height: 162.6 cm (64.02\")   PainSc:   8   PainLoc: Generalized  Comment: joints     Body mass index is 31.14 kg/m².      Physical Exam     General: Well appearing 54 year old  female. Not in distress. She is ambulating unassisted.   SKIN: No rashes. No alopecia. No subcutaneous nodules. No digital pits or ulcers. No sclerodactyly.   HEENT: NCAT. Conjunctiva clear, no photophobia. No oral or nasal ulcers. Hearing intact.    Pulmonary: Clear to auscultation bilaterally. No wheezing, rales, or rhonchi.  CV: Regular rate and rhythm. No murmurs, rubs, or gallops.   Psych: Normal mood and affect. Alert and oriented x 3.   Extremities: No cyanosis or edema.   Musculoskeletal: No joint swelling or tenderness to palpation. No warmth or erythema. Normal range of motion of the wrists, ankles, elbows, and knees.   Lymph: No palpable cervical adenopathy      Procedures    Assessment / Plan      Assessment & Plan  Psoriatic arthritis  Medication/treatment/interventions tried include: Tylenol, ibuprofen, MTX/folic acid, Humira, Cosentyx, diclofenac gel, physical therapy, SI joint injection, She saw an orthopaedic surgeon (Dr. Cat), 1st [facility] joint injection   Continue/refill MTX and folic acid  Continue/refill Cosentyx. She rates her pain today 8/10 in severity. We will increase her dose from 150 mg/month to 300 mg/month. Risks and benefits reviewed.   Follow up in 3-4 months  Check labs  We gave her a handout on psoriatic arthritis to take home and review.   High risk medication use  Methotrexate 20 mg PO once/week for psoriasis/psoriatic arthritis  1. CBC and CMP every 8-12 weeks to monitor for medication toxicity.   2. Take folate supplements daily.  3. No recent serious infections.  4. 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 Hold if the patient develops infection.   2. Avoid live vaccines while on this medication.   3. No recent serious infections  4. No injection site reactions.   5. Also hold this medication perioperatively if the patient is going to have a surgical procedure    Primary osteoarthritis involving multiple joints  Tylenol PRN is ok as directed  She has tried oral and topical NSAIDS   She has seen orthopaedic surgeons  She has done some physical therapy   She has had SI Joint injection   She has had 1st [facility] joint injection     Orders Placed This Encounter   Procedures    CBC Auto Differential    Comprehensive Metabolic Panel    C-reactive Protein    Sedimentation Rate     New Medications Ordered This Visit   Medications    methotrexate 2.5 MG tablet     Sig: Take 8 tablets by mouth 1 (One) Time Per Week. 8 TABLETS ON SUNDAYS     Dispense:  32 tablet     Refill:  4    folic acid (FOLVITE) 1 MG tablet     Sig: Take 4-5 tablets by mouth Daily. TAKE 4-5 TABLETS EVERYDAY EXCEPT THE DAY OF METHOTREXATE (SUNDAY)     Dispense:  150 tablet     Refill:  3    Diclofenac Sodium (VOLTAREN) 1 % gel gel     Sig: Apply  topically to the appropriate area as directed 4 (Four) Times a Day As Needed (pain relief). APPLY (2G) TOPICAL AS DIRECTED     Dispense:  300 g     Refill:  4    Secukinumab (Cosentyx Sensoready Pen) 150 MG/ML solution auto-injector     Sig: Inject 300 mg under the skin into the appropriate area as directed Every 30 (Thirty) Days. AS DIRECTED   EVERY 4 WEEKS     Dispense:  4 mL     Refill:  4         Follow Up:   Return in about 4 months (around 11/12/2024).      Mansoor Mary DO  [facility]

## 2024-07-12 NOTE — ASSESSMENT & PLAN NOTE
Medication/treatment/interventions tried include: Tylenol, ibuprofen, MTX/folic acid, Humira, Cosentyx, diclofenac gel, physical therapy, SI joint injection, She saw an orthopaedic surgeon (Dr. Cat), 1st [facility] joint injection   Continue/refill MTX and folic acid  Continue/refill Cosentyx. She rates her pain today 8/10 in severity. We will increase her dose from 150 mg/month to 300 mg/month. Risks and benefits reviewed.   Follow up in 3-4 months  Check labs  We gave her a handout on psoriatic arthritis to take home and review.

## 2024-07-12 NOTE — ASSESSMENT & PLAN NOTE
Cosentyx 300 mg SQ injection once/month for psoriasis/psoriatic arthritis    1. Hold if the patient develops infection.   2. Avoid live vaccines while on this medication.   3. No recent serious infections  4. No injection site reactions.   5. Also hold this medication perioperatively if the patient is going to have a surgical procedure

## 2024-07-12 NOTE — ASSESSMENT & PLAN NOTE
Tylenol PRN is ok as directed  She has tried oral and topical NSAIDS   She has seen orthopaedic surgeons  She has done some physical therapy   She has had SI Joint injection   She has had 1st [facility] joint injection

## 2024-07-15 ENCOUNTER — TELEPHONE (OUTPATIENT)
Age: 54
End: 2024-07-15
Payer: COMMERCIAL

## 2024-07-15 ENCOUNTER — SPECIALTY PHARMACY (OUTPATIENT)
Age: 54
End: 2024-07-15
Payer: COMMERCIAL

## 2024-07-18 ENCOUNTER — TELEPHONE (OUTPATIENT)
Age: 54
End: 2024-07-18
Payer: COMMERCIAL

## 2024-07-18 NOTE — TELEPHONE ENCOUNTER
Cosentyx 150mg/ml solution auto-inj.  Clarify the dosage, used 150 every 4 weeks and now it is 300mg every 4 week.

## 2024-08-29 ENCOUNTER — TELEPHONE (OUTPATIENT)
Dept: UROLOGY | Facility: CLINIC | Age: 54
End: 2024-08-29
Payer: COMMERCIAL

## 2024-08-29 NOTE — TELEPHONE ENCOUNTER
Caller: Yue Jernigan    Relationship to patient: Self    Best call back number: [phone]    Chief complaint: BLOOD IN URINE    Type of visit: NEW PATIENT    Requested date: ASAP     Additional notes:CALLED PATIENT TO SCHEDULE FOR REFERRAL. PATIENT STATES THAT SHE HAS BEEN SEEING BLOOD IN HER URINE FOR THE LAST 6 MONTHS AND SHE WOULD LIKE A SOONER APPT IF POSSIBLE.     PATIENT STATES THAT IT IS OK TO RESPOND IN HER MYCHART OR TO LEAVE A DETAILED VOICEMAIL ABOUT THIS.

## 2024-10-22 ENCOUNTER — OFFICE VISIT (OUTPATIENT)
Age: 54
End: 2024-10-22
Payer: COMMERCIAL

## 2024-10-22 VITALS — BODY MASS INDEX: 30.97 KG/M2 | WEIGHT: 181.4 LBS | HEIGHT: 64 IN

## 2024-10-22 DIAGNOSIS — R31.9 HEMATURIA, UNSPECIFIED TYPE: Primary | ICD-10-CM

## 2024-10-22 NOTE — PROGRESS NOTES
Gross Hematuria Female Office Visit      Patient Name: Yue Jernigan  : 1970   MRN: [id number]     Chief Complaint:  Gross Hematuria.   Chief Complaint   Patient presents with    Blood in Urine    Urinary Incontinence       Referring Provider: Melanie Burgos MD    History of Present Illness: Ms. Jernigan is a 54 y.o. female with history of gross hematuria.  She reports for several months she has been seeing blood and particles in the bottom of the toilet.  She reports her urine has never been completely red.  No history of kidney stones.   She reports she had midurethral sling placed in February.  She reports she also has right sided issues with her gut and her bowels do not move correctly.  She is on Linzess and takes suppository every third day.    She has never been to a urologist.      Subjective      Review of System:   Review of Systems   I have reviewed the ROS documented by my clinical staff,  I have updated appropriately and I agree. Monica Boss MD    Past Medical History:  Past Medical History:   Diagnosis Date    Arthritis     Back pain     Clotting disorder 2023    visible blood and particles in urine    Disease of thyroid gland     Elevated serum creatinine     Hypothyroidism     Methotrexate, long term, current use     Psoriasis     Psoriatic arthritis     Vitiligo        Past Surgical History:  Past Surgical History:   Procedure Laterality Date    BLADDER SURGERY  2024    Incontenence bladder fixed with mesh     SECTION      with bilateral tubal ligation    COLONOSCOPY      MID-URETHRAL SLING WITH CYSTOSCOPY N/A 2024    Procedure: MID-URETHRAL SLING;  Surgeon: Melanie Burgos MD;  Location: [facility];  Service: Gynecology;  Laterality: N/A;    TUBAL ABDOMINAL LIGATION  2007    with  delivery       Medications:    Current Outpatient Medications:     Azelastine-Fluticasone 137-50 MCG/ACT suspension, into the nostril(s) as directed  by provider As Needed., Disp: , Rfl:     bisacodyl (DULCOLAX) 10 MG suppository, Insert 1 suppository into the rectum Daily As Needed for Constipation., Disp: , Rfl:     clobetasol (TEMOVATE) 0.05 % ointment, Apply 1 Application topically to the appropriate area as directed 2 (Two) Times a Day As Needed., Disp: , Rfl:     Diclofenac Sodium (VOLTAREN) 1 % gel gel, Apply  topically to the appropriate area as directed 4 (Four) Times a Day As Needed (pain relief). APPLY (2G) TOPICAL AS DIRECTED, Disp: 300 g, Rfl: 4    EPINEPHrine (EPIPEN) 0.3 MG/0.3ML solution auto-injector injection, , Disp: , Rfl:     fluconazole (DIFLUCAN) 150 MG tablet, Take  by mouth As Needed., Disp: , Rfl:     folic acid (FOLVITE) 1 MG tablet, Take 4-5 tablets by mouth Daily. TAKE 4-5 TABLETS EVERYDAY EXCEPT THE DAY OF METHOTREXATE (SUNDAY), Disp: 150 tablet, Rfl: 3    levocetirizine (XYZAL) 5 MG tablet, Take 1 tablet by mouth Every Evening., Disp: , Rfl:     levothyroxine (SYNTHROID, LEVOTHROID) 25 MCG tablet, Take 1 tablet by mouth Every Morning., Disp: , Rfl:     linaclotide (LINZESS) 290 MCG capsule capsule, Take 1 capsule by mouth Daily As Needed., Disp: , Rfl:     methotrexate 2.5 MG tablet, Take 8 tablets by mouth 1 (One) Time Per Week. 8 TABLETS ON SUNDAYS, Disp: 32 tablet, Rfl: 4    montelukast (SINGULAIR) 10 MG tablet, Take 1 tablet by mouth At Night As Needed., Disp: , Rfl:     Secukinumab (Cosentyx Sensoready Pen) 150 MG/ML solution auto-injector, Inject 300 mg under the skin into the appropriate area as directed Every 30 (Thirty) Days. AS DIRECTED   EVERY 4 WEEKS, Disp: 4 mL, Rfl: 4    spironolactone (ALDACTONE) 100 MG tablet, Take 1 tablet by mouth Daily. FOR ACNE, Disp: , Rfl:     ibuprofen (ADVIL,MOTRIN) 600 MG tablet, Take 1 tablet by mouth Every 6 (Six) Hours As Needed for Mild Pain., Disp: , Rfl:     ondansetron (ZOFRAN) 4 MG tablet, 1 tablet po every 8 hours for 3 days As needed for post operative nausea, Disp: , Rfl:      pantoprazole (PROTONIX) 40 MG EC tablet, Take 1 tablet every day by oral route at bedtime for 30 days., Disp: , Rfl:     Allergies:  Allergies   Allergen Reactions    Shellfish-Derived Products Nausea Only    Ciprofloxacin Unknown - Low Severity     Pt states she does not respond to Cipro.        Social History:  Social History     Socioeconomic History    Marital status:    Tobacco Use    Smoking status: Never    Smokeless tobacco: Never   Vaping Use    Vaping status: Never Used   Substance and Sexual Activity    Alcohol use: Not Currently    Drug use: Not Currently    Sexual activity: Yes     Partners: Male     Birth control/protection: Tubal ligation       Family History:  Family History   Problem Relation Age of Onset    Arthritis Mother        Bladder & Bowel Symptom Questionnaire    How often do you usually urinate during the day ?   3 - About every 1-2 hours   2.   How many timed do you urinate at night?   0 - 0-1 time at night   3.   What is the reason that you usually urinate?   2 - Moderate urge (can delay 10-60 min)   4.   Once you get the urge to go, how long can you     comfortably delay?   2 - 10-30 min   5.   How often do you get a sudden urge that makes you rush to the bathroom?   2 - A few times a month   6.   How often does a sudden urge to urinate result in you leaking urine or wetting pads?   2 - A few times a month   7.  In your opinion, how good is your bladder control?   2 - Good   8.  Do you have accidental bowel leakage?   no   9.  Do you have difficulty fully emptying your bladder?   no   10.  Do you experience accidental leakage when performing some physical activity such as coughing, sneezing, laughing or exercise?   yes   11. Have you tried medications to help improve your symptoms?   yes   12. Would you be interested in learning about a long-lasting option that may help you with your symptoms?   no                                                                             Total  "Score   13     0-7 (Mild) 8-16 (Moderate) 17-28 (Severe)       Objective     Physical Exam:   Vital Signs:   Vitals:    10/22/24 1428   Weight: 82.3 kg (181 lb 6.4 oz)   Height: 162.6 cm (64.02\")     Body mass index is 31.12 kg/m².     Physical Exam  Vitals and nursing note reviewed. Exam conducted with a chaperone present.   Constitutional:       General: She is awake. She is not in acute distress.     Appearance: Normal appearance.   HENT:      Head: Normocephalic and atraumatic.      Right Ear: External ear normal.      Left Ear: External ear normal.      Nose: Nose normal.   Eyes:      Conjunctiva/sclera: Conjunctivae normal.   Pulmonary:      Effort: Pulmonary effort is normal. No respiratory distress.   Abdominal:      General: Abdomen is flat. There is no distension.      Palpations: Abdomen is soft. There is no mass.      Tenderness: There is no abdominal tenderness. There is no right CVA tenderness, left CVA tenderness, guarding or rebound.      Hernia: No hernia is present.   Genitourinary:     Exam position: Lithotomy position.   Skin:     General: Skin is warm.   Neurological:      General: No focal deficit present.      Mental Status: She is alert and oriented to person, place, and time.      Gait: Gait normal.   Psychiatric:         Behavior: Behavior normal. Behavior is cooperative.         Thought Content: Thought content normal.         Judgment: Judgment normal.         Labs:   Brief Urine Lab Results  (Last result in the past 365 days)        Color   Clarity   Blood   Leuk Est   Nitrite   Protein   CREAT   Urine HCG        02/08/24 1154               Negative                    Lab Results   Component Value Date    GLUCOSE 79 07/12/2024    CALCIUM 9.2 07/12/2024     07/12/2024    K 4.0 07/12/2024    CO2 24.8 07/12/2024     07/12/2024    BUN 14 07/12/2024    CREATININE 0.77 07/12/2024    BCR 18.2 07/12/2024    ANIONGAP 10.2 07/12/2024       Lab Results   Component Value Date    WBC 6.61 " "07/12/2024    HGB 13.1 07/12/2024    HCT 39.9 07/12/2024    MCV 92.8 07/12/2024     07/12/2024       Images:   No Images in the past 120 days found..    Measures:   Tobacco:   Yue Jernigan  reports that she has never smoked. She has never used smokeless tobacco.       Urine Incontinence: Patient reports that she is not currently experiencing any symptoms of urinary incontinence.       Assessment / Plan      Assessment/Plan:   Ms. Jernigan is a 54 y.o. female who presented today for evaluation of gross hematuria      The patient was counseled regarding the possible etiologies, relevant work-up and diagnostic approach for gross hematuria, as well as the relevant risk categories as assigned by the American Urological Association guidelines.  I discussed that the definition of microscopic hematuria includes a microscopic urinalysis (not dipstick UA) positive for greater than 3 RBCs per high-powered field under microscopy.  We also discussed that any history of gross hematuria (or visible hematuria) places a patient at higher risk for occult urologic malignancies and necessitates prompt workup.  We discussed the aforementioned risk categories as assigned by the AUA, which are depicted below.  Ultimately, work-up is mandatory for gross or visible hematuria, as indicated by the \"HIGH RISK\" category and recommendations as depicted by the AUA Guidelines.  Given the patient's age, no smoking history, gross hematuria; the patient is deemed HIGH risk, and for these reasons I recommend proceeding with a flexible diagnostic cystoscopy and CT urogram to assess the collecting system of the kidney and bladder.            Diagnoses and all orders for this visit:    1. Hematuria, unspecified type (Primary)  -     POC Urinalysis Dipstick, Automated  -     CT Abdomen Pelvis With & Without Contrast; Future        Follow Up:   Return in about 3 weeks (around 11/12/2024) for Procedure next visit.    I spent approximately 30 " minutes providing clinical care for this patient; including review of patient's chart and provider documentation, face to face time spent with patient in examination room (obtaining history, performing physical exam, discussing diagnosis and management options), placing orders, and completing patient documentation.     Monica Boss MD  [facility] [place]

## 2024-11-15 ENCOUNTER — TELEPHONE (OUTPATIENT)
Dept: UROLOGY | Facility: CLINIC | Age: 54
End: 2024-11-15
Payer: COMMERCIAL

## 2024-11-15 NOTE — TELEPHONE ENCOUNTER
Pt's CT scan scheduled for after 11/19. Please reschedule her appointment for after 11/27. Thank you!

## 2024-11-15 NOTE — TELEPHONE ENCOUNTER
Called pt and gave her potential time for 12/20 at 10:10 am for her cysto, will check on Monday to make sure we can keep that date and time

## 2024-11-22 NOTE — ASSESSMENT & PLAN NOTE
Medication/treatment/interventions tried include: Tylenol, ibuprofen, MTX/folic acid, Humira, Cosentyx, diclofenac gel, physical therapy, SI joint injection, She saw an orthopaedic surgeon (Dr. Cat), 1st [facility] joint injection   Continue/refill MTX and folic acid  Continue/refill Cosentyx 300 mg monthly  Check labs today  Return to clinic 3 to 4 months

## 2024-11-22 NOTE — PROGRESS NOTES
Office Follow Up      Date: 11/26/2024   Patient Name: Yue Jernigan  MRN: [id number]  YOB: 1970    Referring Physician: No ref. provider found     Chief Complaint   Patient presents with    Psoriatic arthritis       History of Present Illness: Yue Jernigan is a 54 y.o. female who is here today for follow up.     Today she rates her pain as 8/10 in severity. She has 3-5 minutes/day of morning stiffness. No red or hot joints. No swelling. No muscle pain or weakness. No back or neck problems.     She has been following with urology for blood in the urine. She has upcoming cystoscopy. She has chronic constipation. She is considering 2nd opinion with GI specialist.    Subjective     Review of Systems negative per patient.      Current Outpatient Medications:     acetaminophen (TYLENOL) 500 MG tablet, , Disp: , Rfl:     Azelastine-Fluticasone 137-50 MCG/ACT suspension, into the nostril(s) as directed by provider As Needed., Disp: , Rfl:     bisacodyl (DULCOLAX) 10 MG suppository, Insert 1 suppository into the rectum Daily As Needed for Constipation., Disp: , Rfl:     Diclofenac Sodium (VOLTAREN) 1 % gel gel, Apply  topically to the appropriate area as directed 4 (Four) Times a Day As Needed (pain relief). APPLY (2G) TOPICAL AS DIRECTED, Disp: 300 g, Rfl: 4    EPINEPHrine (EPIPEN) 0.3 MG/0.3ML solution auto-injector injection, , Disp: , Rfl:     fluconazole (DIFLUCAN) 150 MG tablet, Take  by mouth As Needed., Disp: , Rfl:     folic acid (FOLVITE) 1 MG tablet, Take 4-5 tablets by mouth Daily. TAKE 4-5 TABLETS EVERYDAY EXCEPT THE DAY OF METHOTREXATE (SUNDAY), Disp: 150 tablet, Rfl: 3    levocetirizine (XYZAL) 5 MG tablet, Take 1 tablet by mouth Every Evening., Disp: , Rfl:     levothyroxine (SYNTHROID, LEVOTHROID) 25 MCG tablet, Take 1 tablet by mouth Every Morning., Disp: , Rfl:     linaclotide (LINZESS) 290 MCG capsule capsule, Take 1 capsule by mouth Daily As Needed.,  "Disp: , Rfl:     methotrexate 2.5 MG tablet, Take 8 tablets by mouth 1 (One) Time Per Week. 8 TABLETS ON SUNDAYS, Disp: 32 tablet, Rfl: 4    montelukast (SINGULAIR) 10 MG tablet, Take 1 tablet by mouth At Night As Needed., Disp: , Rfl:     oxyCODONE (ROXICODONE) 5 MG immediate release tablet, , Disp: , Rfl:     Secukinumab (Cosentyx Sensoready Pen) 150 MG/ML solution auto-injector, Inject 300 mg under the skin into the appropriate area as directed Every 30 (Thirty) Days. AS DIRECTED   EVERY 4 WEEKS, Disp: 4 mL, Rfl: 4    spironolactone (ALDACTONE) 100 MG tablet, Take 1 tablet by mouth Daily. FOR ACNE, Disp: , Rfl:     Allergies   Allergen Reactions    Shellfish-Derived Products Nausea Only    Ciprofloxacin Unknown - Low Severity     Pt states she does not respond to Cipro.        I have reviewed and updated the patient's chief complaint, history of present illness, review of systems, past medical history, surgical history, family history, social history, medications and allergy list as appropriate.     Objective      Vitals:    11/26/24 1523   BP: 108/60   BP Location: Left arm   Pulse: 94   Temp: 97.3 °F (36.3 °C)   Weight: 82.6 kg (182 lb)   Height: 162.6 cm (64\")   PainSc:   8   PainLoc: Generalized       Body mass index is 31.24 kg/m².      Physical Exam     General: Well appearing 54 year old  female. Not in distress. She is ambulating unassisted.   SKIN: No rashes. No alopecia. No subcutaneous nodules. No digital pits or ulcers. No sclerodactyly.   HEENT: NCAT. Conjunctiva clear, no photophobia. No oral or nasal ulcers. Hearing intact.    Pulmonary: Clear to auscultation bilaterally. No wheezing, rales, or rhonchi.  CV: Regular rate and rhythm. No murmurs, rubs, or gallops.   Psych: Normal mood and affect. Alert and oriented x 3.   Extremities: No cyanosis or edema.   Musculoskeletal: No joint swelling or tenderness to palpation. No warmth or erythema. Normal range of motion of the wrists, ankles, " elbows, and knees.   Lymph: No palpable cervical adenopathy        Metrics  ROMERO-28 (ESR): 2.09 (Remission)  ROMERO-28 (CRP): 2.17 (Remission)  Tender (ROMERO-28): 0 / 28   Swollen (ROMERO-28): 0 / 28     This Exam  Provider Global: 10 / 100  Patient Global: 80 / 100  ESR: 4 mm/hr  CRP: 0.3 mg/L    Assessment / Plan      Assessment & Plan  Psoriatic arthritis  Medication/treatment/interventions tried include: Tylenol, ibuprofen, MTX/folic acid, Humira, Cosentyx, diclofenac gel, physical therapy, SI joint injection, She saw an orthopaedic surgeon (Dr. Cat), 1st [facility] joint injection   Continue/refill MTX and folic acid  Continue/refill Cosentyx 300 mg monthly  Check labs today  Return to clinic 3 to 4 months  Immunodeficiency due to drug therapy  Cosentyx 300 mg SQ injection once/month for psoriasis/psoriatic arthritis    1. Hold if the patient develops infection.   2. Avoid live vaccines while on this medication.   3. No recent serious infections  4. No injection site reactions.   5. Also hold this medication perioperatively if the patient is going to have a surgical procedure    High risk medication use  Methotrexate 20 mg PO once/week for psoriasis/psoriatic arthritis    1. CBC and CMP every 8-12 weeks to monitor for medication toxicity.   2. Take folate supplements daily.  3. No recent serious infections.  4. Refill today    Primary osteoarthritis involving multiple joints  Tylenol PRN is ok as directed  She has tried oral and topical NSAIDS   She has seen orthopaedic surgeons  She has done some physical therapy   She has had SI Joint injection   She has had 1st [facility] joint injection   Fatigue, unspecified type  Update QTB and hepatitis panel today    Follow Up:   Return in about 4 months (around 3/26/2025) for BREANNA Rod.      RBEANNA Rod  [facility]

## 2024-11-26 ENCOUNTER — OFFICE VISIT (OUTPATIENT)
Age: 54
End: 2024-11-26
Payer: COMMERCIAL

## 2024-11-26 ENCOUNTER — LAB (OUTPATIENT)
Facility: HOSPITAL | Age: 54
End: 2024-11-26
Payer: COMMERCIAL

## 2024-11-26 VITALS
HEIGHT: 64 IN | WEIGHT: 182 LBS | BODY MASS INDEX: 31.07 KG/M2 | DIASTOLIC BLOOD PRESSURE: 60 MMHG | SYSTOLIC BLOOD PRESSURE: 108 MMHG | TEMPERATURE: 97.3 F | HEART RATE: 94 BPM

## 2024-11-26 DIAGNOSIS — L40.50 PSORIATIC ARTHRITIS: Primary | ICD-10-CM

## 2024-11-26 DIAGNOSIS — Z79.899 HIGH RISK MEDICATION USE: Chronic | ICD-10-CM

## 2024-11-26 DIAGNOSIS — R53.83 FATIGUE, UNSPECIFIED TYPE: ICD-10-CM

## 2024-11-26 DIAGNOSIS — Z79.899 IMMUNODEFICIENCY DUE TO DRUG THERAPY: Chronic | ICD-10-CM

## 2024-11-26 DIAGNOSIS — L40.50 PSORIATIC ARTHRITIS: ICD-10-CM

## 2024-11-26 DIAGNOSIS — D84.821 IMMUNODEFICIENCY DUE TO DRUG THERAPY: ICD-10-CM

## 2024-11-26 DIAGNOSIS — M15.0 PRIMARY OSTEOARTHRITIS INVOLVING MULTIPLE JOINTS: Chronic | ICD-10-CM

## 2024-11-26 DIAGNOSIS — D84.821 IMMUNODEFICIENCY DUE TO DRUG THERAPY: Chronic | ICD-10-CM

## 2024-11-26 DIAGNOSIS — Z79.899 IMMUNODEFICIENCY DUE TO DRUG THERAPY: ICD-10-CM

## 2024-11-26 PROBLEM — D21.9 FIBROID: Status: ACTIVE | Noted: 2024-11-26

## 2024-11-26 PROBLEM — K59.00 CONSTIPATION: Status: ACTIVE | Noted: 2023-02-02

## 2024-11-26 LAB
BASOPHILS # BLD AUTO: 0.04 10*3/MM3 (ref 0–0.2)
BASOPHILS NFR BLD AUTO: 0.6 % (ref 0–1.5)
DEPRECATED RDW RBC AUTO: 44.9 FL (ref 37–54)
EOSINOPHIL # BLD AUTO: 0.1 10*3/MM3 (ref 0–0.4)
EOSINOPHIL NFR BLD AUTO: 1.5 % (ref 0.3–6.2)
ERYTHROCYTE [DISTWIDTH] IN BLOOD BY AUTOMATED COUNT: 13 % (ref 12.3–15.4)
ERYTHROCYTE [SEDIMENTATION RATE] IN BLOOD: 7 MM/HR (ref 0–30)
HCT VFR BLD AUTO: 42.4 % (ref 34–46.6)
HGB BLD-MCNC: 13.8 G/DL (ref 12–15.9)
IMM GRANULOCYTES # BLD AUTO: 0.02 10*3/MM3 (ref 0–0.05)
IMM GRANULOCYTES NFR BLD AUTO: 0.3 % (ref 0–0.5)
LYMPHOCYTES # BLD AUTO: 1.2 10*3/MM3 (ref 0.7–3.1)
LYMPHOCYTES NFR BLD AUTO: 18.5 % (ref 19.6–45.3)
MCH RBC QN AUTO: 31.2 PG (ref 26.6–33)
MCHC RBC AUTO-ENTMCNC: 32.5 G/DL (ref 31.5–35.7)
MCV RBC AUTO: 95.9 FL (ref 79–97)
MONOCYTES # BLD AUTO: 0.62 10*3/MM3 (ref 0.1–0.9)
MONOCYTES NFR BLD AUTO: 9.6 % (ref 5–12)
NEUTROPHILS NFR BLD AUTO: 4.51 10*3/MM3 (ref 1.7–7)
NEUTROPHILS NFR BLD AUTO: 69.5 % (ref 42.7–76)
NRBC BLD AUTO-RTO: 0 /100 WBC (ref 0–0.2)
PLATELET # BLD AUTO: 322 10*3/MM3 (ref 140–450)
PMV BLD AUTO: 10.4 FL (ref 6–12)
RBC # BLD AUTO: 4.42 10*6/MM3 (ref 3.77–5.28)
WBC NRBC COR # BLD AUTO: 6.49 10*3/MM3 (ref 3.4–10.8)

## 2024-11-26 PROCEDURE — 80053 COMPREHEN METABOLIC PANEL: CPT

## 2024-11-26 PROCEDURE — 86480 TB TEST CELL IMMUN MEASURE: CPT

## 2024-11-26 PROCEDURE — 99214 OFFICE O/P EST MOD 30 MIN: CPT | Performed by: NURSE PRACTITIONER

## 2024-11-26 PROCEDURE — 80074 ACUTE HEPATITIS PANEL: CPT

## 2024-11-26 PROCEDURE — 36415 COLL VENOUS BLD VENIPUNCTURE: CPT

## 2024-11-26 PROCEDURE — 86140 C-REACTIVE PROTEIN: CPT

## 2024-11-26 PROCEDURE — 85025 COMPLETE CBC W/AUTO DIFF WBC: CPT

## 2024-11-26 PROCEDURE — 85652 RBC SED RATE AUTOMATED: CPT

## 2024-11-26 RX ORDER — MELOXICAM 15 MG/1
TABLET ORAL
COMMUNITY
End: 2024-11-26

## 2024-11-26 RX ORDER — ACETAMINOPHEN 500 MG
TABLET ORAL
COMMUNITY

## 2024-11-26 RX ORDER — FOLIC ACID 1 MG/1
4-5 TABLET ORAL DAILY
Qty: 150 TABLET | Refills: 3 | Status: SHIPPED | OUTPATIENT
Start: 2024-11-26

## 2024-11-26 RX ORDER — OXYCODONE HYDROCHLORIDE 5 MG/1
TABLET ORAL
COMMUNITY

## 2024-11-26 RX ORDER — METHOTREXATE 2.5 MG/1
20 TABLET ORAL WEEKLY
Qty: 32 TABLET | Refills: 4 | Status: SHIPPED | OUTPATIENT
Start: 2024-11-26

## 2024-11-27 ENCOUNTER — HOSPITAL ENCOUNTER (OUTPATIENT)
Dept: CT IMAGING | Facility: HOSPITAL | Age: 54
Discharge: HOME | End: 2024-11-27
Admitting: UROLOGY
Payer: COMMERCIAL

## 2024-11-27 DIAGNOSIS — R31.9 HEMATURIA, UNSPECIFIED TYPE: ICD-10-CM

## 2024-11-27 LAB
ALBUMIN SERPL-MCNC: 4.1 G/DL (ref 3.5–5.2)
ALBUMIN/GLOB SERPL: 1.4 G/DL
ALP SERPL-CCNC: 160 U/L (ref 39–117)
ALT SERPL W P-5'-P-CCNC: 26 U/L (ref 1–33)
ANION GAP SERPL CALCULATED.3IONS-SCNC: 8 MMOL/L (ref 5–15)
AST SERPL-CCNC: 22 U/L (ref 1–32)
BILIRUB SERPL-MCNC: 0.4 MG/DL (ref 0–1.2)
BUN SERPL-MCNC: 14 MG/DL (ref 6–20)
BUN/CREAT SERPL: 16.7 (ref 7–25)
CALCIUM SPEC-SCNC: 9.3 MG/DL (ref 8.6–10.5)
CHLORIDE SERPL-SCNC: 101 MMOL/L (ref 98–107)
CO2 SERPL-SCNC: 27 MMOL/L (ref 22–29)
CREAT SERPL-MCNC: 0.84 MG/DL (ref 0.57–1)
CRP SERPL-MCNC: <0.3 MG/DL (ref 0–0.5)
EGFRCR SERPLBLD CKD-EPI 2021: 82.2 ML/MIN/1.73
GLOBULIN UR ELPH-MCNC: 2.9 GM/DL
GLUCOSE SERPL-MCNC: 87 MG/DL (ref 65–99)
HAV IGM SERPL QL IA: NORMAL
HBV CORE IGM SERPL QL IA: NORMAL
HBV SURFACE AG SERPL QL IA: NORMAL
HCV AB SER QL: NORMAL
POTASSIUM SERPL-SCNC: 4.4 MMOL/L (ref 3.5–5.2)
PROT SERPL-MCNC: 7 G/DL (ref 6–8.5)
SODIUM SERPL-SCNC: 136 MMOL/L (ref 136–145)

## 2024-11-27 PROCEDURE — 25510000001 IOPAMIDOL 61 % SOLUTION: Performed by: UROLOGY

## 2024-11-27 PROCEDURE — 74178 CT ABD&PLV WO CNTR FLWD CNTR: CPT

## 2024-11-27 RX ORDER — IOPAMIDOL 612 MG/ML
100 INJECTION, SOLUTION INTRAVASCULAR
Status: COMPLETED | OUTPATIENT
Start: 2024-11-27 | End: 2024-11-27

## 2024-11-27 RX ADMIN — IOPAMIDOL 100 ML: 612 INJECTION, SOLUTION INTRAVENOUS at 09:40

## 2024-11-29 LAB
GAMMA INTERFERON BACKGROUND BLD IA-ACNC: 0 IU/ML
M TB IFN-G BLD-IMP: NEGATIVE
M TB IFN-G CD4+ BCKGRND COR BLD-ACNC: 0.01 IU/ML
M TB IFN-G CD4+CD8+ BCKGRND COR BLD-ACNC: 0.01 IU/ML
MITOGEN IGNF BCKGRD COR BLD-ACNC: >10 IU/ML
QUANTIFERON INCUBATION: NORMAL
SERVICE CMNT-IMP: NORMAL

## 2024-12-20 ENCOUNTER — PROCEDURE VISIT (OUTPATIENT)
Dept: UROLOGY | Facility: CLINIC | Age: 54
End: 2024-12-20
Payer: COMMERCIAL

## 2024-12-20 VITALS — HEIGHT: 64 IN | BODY MASS INDEX: 31.09 KG/M2 | WEIGHT: 182.1 LBS

## 2024-12-20 DIAGNOSIS — N95.2 ATROPHIC VAGINITIS: Primary | ICD-10-CM

## 2024-12-20 RX ORDER — ESTRADIOL 0.1 MG/G
CREAM VAGINAL
Qty: 42.5 G | Refills: 3 | Status: SHIPPED | OUTPATIENT
Start: 2024-12-20

## 2024-12-20 NOTE — PROGRESS NOTES
Preprocedure diagnosis  Hematuria    Postprocedure diagnosis  Same    Procedure  Flexible Cystourethroscopy    Attending surgeon  Monica Boss MD    Anesthesia  2% lidocaine jelly intraurethrally    Complications  None    Indications  54 y.o. female undergoing a flexible cystoscopy for the above mentioned indications.      Informed consent was obtained prior to the procedure start.       Findings  Cystoscopy revealed normal bladder mucosa with NO tumors, masses, stones or trabeculations noted.      Procedure  The patient was placed in supine position and prepped and draped in sterile fashion with lidocaine jelly instilled 5 minutes pre-procedure start.  A brief timeout including available nursing staff and awake patient was performed.  The 16 Fr digital flexible cystoscope was lubricated and gently placed into the urethral meatus. The proximal and distal portions of the urethra appeared well vascularized and normal in appearance. The bladder neck was visualized and appeared well vascularized without mucosal lesions or abnormal appearance. The bladder was then entered and  completely visualized including the trigone. There were bilateral orthotopic ureteral orifices which appeared patent and effluxed clear yellow urine. The posterior wall, lateral walls, anterior wall, and dome were visualized. The cystoscope was then retroflexed and the bladder neck was further visualized and appeared normal.  The scope was gently withdrawn and the procedure terminated.  The patient tolerated the procedure well.       A/P: Ms. Jernigan is a 54-year-old female who presented with hematuria.  She has undergone hematuria workup which has been negative.  She appears to have some atrophic vaginitis and I have written her Estrace cream.  We will see her back in 6 months with a UA.  Instructions for use were given along with risks and side effects.

## 2025-04-03 ENCOUNTER — LAB (OUTPATIENT)
Facility: HOSPITAL | Age: 55
End: 2025-04-03
Payer: COMMERCIAL

## 2025-04-03 ENCOUNTER — OFFICE VISIT (OUTPATIENT)
Age: 55
End: 2025-04-03
Payer: COMMERCIAL

## 2025-04-03 VITALS
HEIGHT: 64 IN | WEIGHT: 180 LBS | HEART RATE: 101 BPM | TEMPERATURE: 98 F | DIASTOLIC BLOOD PRESSURE: 78 MMHG | SYSTOLIC BLOOD PRESSURE: 110 MMHG | BODY MASS INDEX: 30.73 KG/M2

## 2025-04-03 DIAGNOSIS — Z79.899 HIGH RISK MEDICATION USE: Chronic | ICD-10-CM

## 2025-04-03 DIAGNOSIS — Z79.899 IMMUNODEFICIENCY DUE TO DRUG THERAPY: Chronic | ICD-10-CM

## 2025-04-03 DIAGNOSIS — L40.50 PSORIATIC ARTHRITIS: Primary | Chronic | ICD-10-CM

## 2025-04-03 DIAGNOSIS — D84.821 IMMUNODEFICIENCY DUE TO DRUG THERAPY: Chronic | ICD-10-CM

## 2025-04-03 DIAGNOSIS — M15.0 PRIMARY OSTEOARTHRITIS INVOLVING MULTIPLE JOINTS: Chronic | ICD-10-CM

## 2025-04-03 DIAGNOSIS — L40.50 PSORIATIC ARTHRITIS: Chronic | ICD-10-CM

## 2025-04-03 LAB
BASOPHILS # BLD AUTO: 0.05 10*3/MM3 (ref 0–0.2)
BASOPHILS NFR BLD AUTO: 0.9 % (ref 0–1.5)
DEPRECATED RDW RBC AUTO: 43.1 FL (ref 37–54)
EOSINOPHIL # BLD AUTO: 0.2 10*3/MM3 (ref 0–0.4)
EOSINOPHIL NFR BLD AUTO: 3.7 % (ref 0.3–6.2)
ERYTHROCYTE [DISTWIDTH] IN BLOOD BY AUTOMATED COUNT: 12.8 % (ref 12.3–15.4)
ERYTHROCYTE [SEDIMENTATION RATE] IN BLOOD: 5 MM/HR (ref 0–30)
HCT VFR BLD AUTO: 40.2 % (ref 34–46.6)
HGB BLD-MCNC: 13.6 G/DL (ref 12–15.9)
IMM GRANULOCYTES # BLD AUTO: 0.01 10*3/MM3 (ref 0–0.05)
IMM GRANULOCYTES NFR BLD AUTO: 0.2 % (ref 0–0.5)
LYMPHOCYTES # BLD AUTO: 1.33 10*3/MM3 (ref 0.7–3.1)
LYMPHOCYTES NFR BLD AUTO: 24.4 % (ref 19.6–45.3)
MCH RBC QN AUTO: 31.6 PG (ref 26.6–33)
MCHC RBC AUTO-ENTMCNC: 33.8 G/DL (ref 31.5–35.7)
MCV RBC AUTO: 93.3 FL (ref 79–97)
MONOCYTES # BLD AUTO: 0.52 10*3/MM3 (ref 0.1–0.9)
MONOCYTES NFR BLD AUTO: 9.6 % (ref 5–12)
NEUTROPHILS NFR BLD AUTO: 3.33 10*3/MM3 (ref 1.7–7)
NEUTROPHILS NFR BLD AUTO: 61.2 % (ref 42.7–76)
NRBC BLD AUTO-RTO: 0 /100 WBC (ref 0–0.2)
PLATELET # BLD AUTO: 294 10*3/MM3 (ref 140–450)
PMV BLD AUTO: 10.6 FL (ref 6–12)
RBC # BLD AUTO: 4.31 10*6/MM3 (ref 3.77–5.28)
WBC NRBC COR # BLD AUTO: 5.44 10*3/MM3 (ref 3.4–10.8)

## 2025-04-03 PROCEDURE — 36415 COLL VENOUS BLD VENIPUNCTURE: CPT

## 2025-04-03 PROCEDURE — 85652 RBC SED RATE AUTOMATED: CPT

## 2025-04-03 PROCEDURE — 86140 C-REACTIVE PROTEIN: CPT

## 2025-04-03 PROCEDURE — 99214 OFFICE O/P EST MOD 30 MIN: CPT | Performed by: INTERNAL MEDICINE

## 2025-04-03 PROCEDURE — 85025 COMPLETE CBC W/AUTO DIFF WBC: CPT

## 2025-04-03 PROCEDURE — 80053 COMPREHEN METABOLIC PANEL: CPT

## 2025-04-03 RX ORDER — SECUKINUMAB 150 MG/ML
300 INJECTION SUBCUTANEOUS
Qty: 4 ML | Refills: 4 | Status: SHIPPED | OUTPATIENT
Start: 2025-04-03

## 2025-04-03 RX ORDER — FOLIC ACID 1 MG/1
4-5 TABLET ORAL DAILY
Qty: 150 TABLET | Refills: 3 | Status: SHIPPED | OUTPATIENT
Start: 2025-04-03

## 2025-04-03 RX ORDER — METHOTREXATE 2.5 MG/1
20 TABLET ORAL WEEKLY
Qty: 32 TABLET | Refills: 4 | Status: SHIPPED | OUTPATIENT
Start: 2025-04-03

## 2025-04-03 NOTE — PROGRESS NOTES
Office Follow Up      Date: 04/03/2025   Patient Name: Yue Jernigan  MRN: [id number]  YOB: 1970    Referring Physician: No ref. provider found     Chief Complaint   Patient presents with    Osteoarthritis     Follow up    Psoriatic arthritis      Follow up       History of Present Illness: Yue Jernigan is a 54 y.o. female who is here today for follow up.     Today she rates her pain as 3/10 in severity. She has 5 minutes/day of morning stiffness. No red or hot joints. No swelling. No muscle pain or weakness. No back or neck problems.     No new rash/skin changes. No headaches or paresthesias. No abnormal bruising/bleeding. No lymphadenopathy. No abnormal bruising/bleeding. No GI or  issues. No chest pain. No shortness of breath. No sicca symptoms.       Subjective     Review of Systems   HENT: Negative.     Eyes: Negative.    Respiratory: Negative.     Cardiovascular: Negative.    Gastrointestinal: Negative.    Endocrine: Negative.    Genitourinary: Negative.    Musculoskeletal:  Positive for arthralgias.   Skin: Negative.    Allergic/Immunologic: Negative.    Neurological: Negative.    Hematological: Negative.    Psychiatric/Behavioral: Negative.     All other systems reviewed and are negative.         Current Outpatient Medications:     acetaminophen (TYLENOL) 500 MG tablet, , Disp: , Rfl:     Azelastine-Fluticasone 137-50 MCG/ACT suspension, into the nostril(s) as directed by provider As Needed., Disp: , Rfl:     bisacodyl (DULCOLAX) 10 MG suppository, Insert 1 suppository into the rectum Daily As Needed for Constipation., Disp: , Rfl:     Diclofenac Sodium (VOLTAREN) 1 % gel gel, Apply  topically to the appropriate area as directed 4 (Four) Times a Day As Needed (pain relief). APPLY (2G) TOPICAL AS DIRECTED, Disp: 300 g, Rfl: 4    EPINEPHrine (EPIPEN) 0.3 MG/0.3ML solution auto-injector injection, , Disp: , Rfl:     estradiol (ESTRACE) 0.1 MG/GM vaginal cream,  "Apply a pea sized amount to the vagina 3 times/week, Disp: 42.5 g, Rfl: 3    fluconazole (DIFLUCAN) 150 MG tablet, Take  by mouth As Needed., Disp: , Rfl:     folic acid (FOLVITE) 1 MG tablet, Take 4-5 tablets by mouth Daily. TAKE 4-5 TABLETS EVERYDAY EXCEPT THE DAY OF METHOTREXATE (SUNDAY), Disp: 150 tablet, Rfl: 3    levocetirizine (XYZAL) 5 MG tablet, Take 1 tablet by mouth Every Evening., Disp: , Rfl:     levothyroxine (SYNTHROID, LEVOTHROID) 25 MCG tablet, Take 1 tablet by mouth Every Morning., Disp: , Rfl:     linaclotide (LINZESS) 290 MCG capsule capsule, Take 1 capsule by mouth Daily As Needed., Disp: , Rfl:     methotrexate 2.5 MG tablet, Take 8 tablets by mouth 1 (One) Time Per Week. 8 TABLETS ON SUNDAYS, Disp: 32 tablet, Rfl: 4    montelukast (SINGULAIR) 10 MG tablet, Take 1 tablet by mouth At Night As Needed., Disp: , Rfl:     Secukinumab (Cosentyx Sensoready Pen) 150 MG/ML solution auto-injector, Inject 300 mg under the skin into the appropriate area as directed Every 30 (Thirty) Days. AS DIRECTED   EVERY 4 WEEKS, Disp: 4 mL, Rfl: 4    spironolactone (ALDACTONE) 100 MG tablet, Take 1 tablet by mouth Daily. FOR ACNE, Disp: , Rfl:     Allergies   Allergen Reactions    Shellfish-Derived Products Nausea Only    Ciprofloxacin Unknown - Low Severity     Pt states she does not respond to Cipro.        I have reviewed and updated the patient's chief complaint, history of present illness, review of systems, past medical history, surgical history, family history, social history, medications and allergy list as appropriate.     Objective      Vitals:    04/03/25 0939   BP: 110/78   BP Location: Left arm   Pulse: 101   Temp: 98 °F (36.7 °C)   Weight: 81.6 kg (180 lb)   Height: 162.6 cm (64\")   PainSc: 3      Body mass index is 30.9 kg/m².      Physical Exam     General: Well appearing 54 year old  female. Not in distress. She is ambulating unassisted.   SKIN: No rashes. No alopecia. No subcutaneous " nodules. No digital pits or ulcers. No sclerodactyly.   HEENT: NCAT. Conjunctiva clear, no photophobia. No oral or nasal ulcers. Hearing intact.    Pulmonary: Clear to auscultation bilaterally. No wheezing, rales, or rhonchi.  CV: Regular rate and rhythm. No murmurs, rubs, or gallops.   Psych: Normal mood and affect. Alert and oriented x 3.   Extremities: No cyanosis or edema.   Musculoskeletal: No joint swelling or tenderness to palpation. No warmth or erythema. Normal range of motion of the wrists, ankles, elbows, and knees.   Lymph: No palpable cervical adenopathy      Procedures    Assessment / Plan      Assessment & Plan  Psoriatic arthritis  Medication/treatment/interventions tried include: Tylenol, ibuprofen, MTX/folic acid, Humira, Cosentyx, diclofenac gel, physical therapy, SI joint injection, She saw an orthopaedic surgeon (Dr. Cat), [facility]st [facility] joint injection   Continue/refill MTX and folic acid  Continue/refill Cosentyx.    She seems well today.   Follow up in 3-4 months  Check labs  We gave her a handout on psoriasis to take home and review.   High risk medication use  Methotrexate 20 mg PO once/week for psoriasis/psoriatic arthritis  1. CBC and CMP every 8-12 weeks to monitor for medication toxicity.   2. Take folate supplements daily.  3. No recent serious infections.  4. Refill today     Immunodeficiency due to drug therapy  Cosentyx 300 mg SQ injection once/month for psoriasis/psoriatic arthritis     1. Hold if the patient develops infection.   2. Avoid live vaccines while on this medication.   3. No recent serious infections  4. No injection site reactions.   5. Also hold this medication perioperatively if the patient is going to have a surgical procedure  Primary osteoarthritis involving multiple joints  Tylenol PRN is ok as directed  She has tried oral and topical NSAIDS   She has seen orthopaedic surgeons  She has done some physical therapy   She has had SI Joint injection   She has had 1st [facility]  joint injection     Orders Placed This Encounter   Procedures    Comprehensive Metabolic Panel    C-reactive Protein    CBC Auto Differential    Sedimentation Rate       New Medications Ordered This Visit   Medications    methotrexate 2.5 MG tablet     Sig: Take 8 tablets by mouth 1 (One) Time Per Week. 8 TABLETS ON SUNDAYS     Dispense:  32 tablet     Refill:  4    folic acid (FOLVITE) 1 MG tablet     Sig: Take 4-5 tablets by mouth Daily. TAKE 4-5 TABLETS EVERYDAY EXCEPT THE DAY OF METHOTREXATE (SUNDAY)     Dispense:  150 tablet     Refill:  3    Diclofenac Sodium (VOLTAREN) 1 % gel gel     Sig: Apply  topically to the appropriate area as directed 4 (Four) Times a Day As Needed (pain relief). APPLY (2G) TOPICAL AS DIRECTED     Dispense:  300 g     Refill:  4    Secukinumab (Cosentyx Sensoready Pen) 150 MG/ML solution auto-injector     Sig: Inject 300 mg under the skin into the appropriate area as directed Every 30 (Thirty) Days. AS DIRECTED   EVERY 4 WEEKS     Dispense:  4 mL     Refill:  4           Follow Up:   Return in about 4 months (around 8/3/2025).      Mansoor Mary DO  [facility] [place]

## 2025-04-04 LAB
ALBUMIN SERPL-MCNC: 4.1 G/DL (ref 3.5–5.2)
ALBUMIN/GLOB SERPL: 1.6 G/DL
ALP SERPL-CCNC: 156 U/L (ref 39–117)
ALT SERPL W P-5'-P-CCNC: 18 U/L (ref 1–33)
ANION GAP SERPL CALCULATED.3IONS-SCNC: 8.8 MMOL/L (ref 5–15)
AST SERPL-CCNC: 19 U/L (ref 1–32)
BILIRUB SERPL-MCNC: 0.5 MG/DL (ref 0–1.2)
BUN SERPL-MCNC: 16 MG/DL (ref 6–20)
BUN/CREAT SERPL: 16.3 (ref 7–25)
CALCIUM SPEC-SCNC: 9.3 MG/DL (ref 8.6–10.5)
CHLORIDE SERPL-SCNC: 107 MMOL/L (ref 98–107)
CO2 SERPL-SCNC: 25.2 MMOL/L (ref 22–29)
CREAT SERPL-MCNC: 0.98 MG/DL (ref 0.57–1)
CRP SERPL-MCNC: <0.3 MG/DL (ref 0–0.5)
EGFRCR SERPLBLD CKD-EPI 2021: 68.3 ML/MIN/1.73
GLOBULIN UR ELPH-MCNC: 2.6 GM/DL
GLUCOSE SERPL-MCNC: 66 MG/DL (ref 65–99)
POTASSIUM SERPL-SCNC: 4.7 MMOL/L (ref 3.5–5.2)
PROT SERPL-MCNC: 6.7 G/DL (ref 6–8.5)
SODIUM SERPL-SCNC: 141 MMOL/L (ref 136–145)

## 2025-05-15 RX ORDER — SECUKINUMAB 150 MG/ML
INJECTION SUBCUTANEOUS
Refills: 4 | OUTPATIENT
Start: 2025-05-15

## 2025-06-25 NOTE — ASSESSMENT & PLAN NOTE
Medication/treatment/interventions tried include: Tylenol, ibuprofen, MTX/folic acid, Humira, Cosentyx, diclofenac gel, physical therapy, SI joint injection, She saw an orthopaedic surgeon (Dr. Cat), [facility]st [facility] joint injection   Continue/refill MTX and folic acid  Continue/refill Cosentyx.    She seems well today.   Follow up in 3-4 months  Check labs  We gave her a handout on psoriasis to take home and review.    Corine Garcia LMSW

## 2025-07-16 ENCOUNTER — OFFICE VISIT (OUTPATIENT)
Dept: UROLOGY | Facility: CLINIC | Age: 55
End: 2025-07-16
Payer: COMMERCIAL

## 2025-07-16 VITALS — OXYGEN SATURATION: 98 % | SYSTOLIC BLOOD PRESSURE: 123 MMHG | DIASTOLIC BLOOD PRESSURE: 83 MMHG | HEART RATE: 93 BPM

## 2025-07-16 DIAGNOSIS — N94.10 DYSPAREUNIA IN FEMALE: ICD-10-CM

## 2025-07-16 DIAGNOSIS — R31.29 MICROSCOPIC HEMATURIA: ICD-10-CM

## 2025-07-16 DIAGNOSIS — N95.2 VAGINAL ATROPHY: ICD-10-CM

## 2025-07-16 DIAGNOSIS — R31.9 HEMATURIA, UNSPECIFIED TYPE: Primary | ICD-10-CM

## 2025-07-16 LAB
BILIRUB BLD-MCNC: NEGATIVE MG/DL
BILIRUB UR QL STRIP: NEGATIVE
CLARITY UR: CLEAR
CLARITY, POC: CLEAR
COLOR UR: YELLOW
COLOR UR: YELLOW
EXPIRATION DATE: ABNORMAL
GLUCOSE UR STRIP-MCNC: NEGATIVE MG/DL
GLUCOSE UR STRIP-MCNC: NEGATIVE MG/DL
HGB UR QL STRIP.AUTO: NEGATIVE
KETONES UR QL STRIP: ABNORMAL
KETONES UR QL: NEGATIVE
LEUKOCYTE EST, POC: NEGATIVE
LEUKOCYTE ESTERASE UR QL STRIP.AUTO: NEGATIVE
Lab: ABNORMAL
NITRITE UR QL STRIP: NEGATIVE
NITRITE UR-MCNC: NEGATIVE MG/ML
PH UR STRIP.AUTO: 6 [PH] (ref 5–8)
PH UR: 6 [PH] (ref 5–8)
PROT UR QL STRIP: NEGATIVE
PROT UR STRIP-MCNC: NEGATIVE MG/DL
RBC # UR STRIP: ABNORMAL /UL
SP GR UR STRIP: 1.02 (ref 1–1.03)
SP GR UR: 1.02 (ref 1–1.03)
UROBILINOGEN UR QL STRIP: ABNORMAL
UROBILINOGEN UR QL: NORMAL

## 2025-07-16 PROCEDURE — 81001 URINALYSIS AUTO W/SCOPE: CPT

## 2025-07-16 RX ORDER — ESTRADIOL 0.1 MG/G
2 CREAM VAGINAL DAILY
Qty: 42.5 G | Refills: 6 | Status: SHIPPED | OUTPATIENT
Start: 2025-07-16

## 2025-07-16 RX ORDER — VALACYCLOVIR HYDROCHLORIDE 1 G/1
TABLET, FILM COATED ORAL
COMMUNITY
Start: 2025-06-18

## 2025-07-16 RX ORDER — TENAPANOR HYDROCHLORIDE 53.2 MG/1
1 TABLET ORAL DAILY
COMMUNITY
Start: 2025-06-16

## 2025-07-16 RX ORDER — ONDANSETRON 4 MG/1
TABLET, FILM COATED ORAL EVERY 8 HOURS SCHEDULED
COMMUNITY

## 2025-07-16 NOTE — PROGRESS NOTES
"     Microscopic Hematuria Female Office Visit      Patient Name: Yue Jernigan  : 1970   MRN: [id number]     Chief Complaint:  Microscopic Hematuria.     Chief Complaint   Patient presents with    Blood in Urine    Atrophic Vaginitis   .     Referring Provider: No ref. provider found    History of Present Illness: Ms. Jernigan is a 55 y.o. female with history of microscopic gross hematuria.      Patient last seen in our clinic on 2024 for flexible cystoscopy and discussion of CT urogram due to microscopic hematuria workup. Per Dr. Boss's note; \"Cystoscopy revealed normal bladder mucosa with NO tumors, masses, stones or trabeculations noted\".  CT urogram obtained on 2024 revealed no evidence of bladder stones, no bladder masses, negative for nephrolithiasis or hydronephrosis. 5 mm suspected angiomyolipoma in the inferior pole of the right kidney noted.    Patient denies history of urinary tract infections. Patient denies dysuria, gross hematuria or known nephrolithiasis. She does report vaginal dryness and dyspareunia. She has been externally applying vaginal estrogen cream twice weekly. She notes minimal symptom improvement with current regimen.    Urinalysis is negative for infection, positive for +1 RBCs.    Patient is from [place], [place]. Her  is present with her at today's visit.    Subjective      Review of System: Review of Systems   Genitourinary:  Positive for dyspareunia.        Vaginal dryness   All other systems reviewed and are negative.     I have reviewed the ROS documented by my clinical staff, I have updated appropriately and I agree. BREANNA Lewis    Past Medical History:  Past Medical History:   Diagnosis Date    Arthritis     Back pain     Clotting disorder 2023    visible blood and particles in urine    Disease of thyroid gland     Elevated serum creatinine     Hypothyroidism     Methotrexate, long term, current use     Psoriasis     Psoriatic arthritis "     Vitiligo        Past Surgical History:  Past Surgical History:   Procedure Laterality Date    BLADDER SURGERY  2024    Incontenence bladder fixed with mesh     SECTION      with bilateral tubal ligation    COLONOSCOPY      MID-URETHRAL SLING WITH CYSTOSCOPY N/A 2024    Procedure: MID-URETHRAL SLING;  Surgeon: Melanie Burgos MD;  Location: [facility];  Service: Gynecology;  Laterality: N/A;    TUBAL ABDOMINAL LIGATION  2007    with  delivery       Medications:    Current Outpatient Medications:     acetaminophen (TYLENOL) 500 MG tablet, , Disp: , Rfl:     Azelastine-Fluticasone 137-50 MCG/ACT suspension, into the nostril(s) as directed by provider As Needed., Disp: , Rfl:     bisacodyl (DULCOLAX) 10 MG suppository, Insert 1 suppository into the rectum Daily As Needed for Constipation., Disp: , Rfl:     Diclofenac Sodium (VOLTAREN) 1 % gel gel, Apply  topically to the appropriate area as directed 4 (Four) Times a Day As Needed (pain relief). APPLY (2G) TOPICAL AS DIRECTED, Disp: 300 g, Rfl: 4    EPINEPHrine (EPIPEN) 0.3 MG/0.3ML solution auto-injector injection, , Disp: , Rfl:     fluconazole (DIFLUCAN) 150 MG tablet, Take  by mouth As Needed., Disp: , Rfl:     folic acid (FOLVITE) 1 MG tablet, Take 4-5 tablets by mouth Daily. TAKE 4-5 TABLETS EVERYDAY EXCEPT THE DAY OF METHOTREXATE (), Disp: 150 tablet, Rfl: 3    Ibsrela 50 MG tablet, Take 1 tablet by mouth Daily., Disp: , Rfl:     levocetirizine (XYZAL) 5 MG tablet, Take 1 tablet by mouth Every Evening., Disp: , Rfl:     levothyroxine (SYNTHROID, LEVOTHROID) 25 MCG tablet, Take 1 tablet by mouth Every Morning., Disp: , Rfl:     linaclotide (LINZESS) 290 MCG capsule capsule, Take 1 capsule by mouth Daily As Needed., Disp: , Rfl:     methotrexate 2.5 MG tablet, Take 8 tablets by mouth 1 (One) Time Per Week. 8 TABLETS ON SUNDAYS, Disp: 32 tablet, Rfl: 4    montelukast (SINGULAIR) 10 MG tablet, Take 1 tablet by  mouth At Night As Needed., Disp: , Rfl:     ondansetron (ZOFRAN) 4 MG tablet, Every 8 (Eight) Hours., Disp: , Rfl:     Secukinumab (Cosentyx Sensoready Pen) 150 MG/ML solution auto-injector, Inject 300 mg under the skin into the appropriate area as directed Every 30 (Thirty) Days. AS DIRECTED   EVERY 4 WEEKS, Disp: 4 mL, Rfl: 4    spironolactone (ALDACTONE) 100 MG tablet, Take 1 tablet by mouth Daily. FOR ACNE, Disp: , Rfl:     valACYclovir (VALTREX) 1000 MG tablet, TAKE ONE TABLET BY MOUTH TWICE DAILY FOR 5 DAYS AT ONSET OF SYMPTOMS, Disp: , Rfl:     estradiol (ESTRACE) 0.1 MG/GM vaginal cream, Insert 2 g into the vagina Daily. Insert 2 g of vaginal estrogen cream nightly for 2 weeks, then transition to inserting 1 g of vaginal Estrace 2-3 times per week for vaginal dryness and dyspareunia., Disp: 42.5 g, Rfl: 6    Allergies:  Allergies   Allergen Reactions    Shellfish-Derived Products Nausea Only    Ciprofloxacin Unknown - Low Severity     Pt states she does not respond to Cipro.        Social History:  Social History     Socioeconomic History    Marital status:    Tobacco Use    Smoking status: Never     Passive exposure: Never    Smokeless tobacco: Never   Vaping Use    Vaping status: Never Used   Substance and Sexual Activity    Alcohol use: Not Currently     Comment: 1 drink about 4 times a year..    Drug use: Never    Sexual activity: Yes     Partners: Male     Birth control/protection: Tubal ligation       Family History:  Family History   Problem Relation Age of Onset    Hypertension Father     Arthritis Mother     Kidney cancer Neg Hx         bladder cancer     Bladder & Bowel Symptom Questionnaire    How often do you usually urinate during the day ?   1 - About every 3-4 hours   2.   How many timed do you urinate at night?   0 - 0-1 time at night   3.   What is the reason that you usually urinate?   2 - Moderate urge (can delay 10-60 min)   4.   Once you get the urge to go, how long can you      comfortably delay?   2 - 10-30 min   5.   How often do you get a sudden urge that makes you rush to the bathroom?   2 - A few times a month   6.   How often does a sudden urge to urinate result in you leaking urine or wetting pads?   2 - A few times a month   7.  In your opinion, how good is your bladder control?   1 - Very Good   8.  Do you have accidental bowel leakage?   no   9.  Do you have difficulty fully emptying your bladder?   no   10.  Do you experience accidental leakage when performing some physical activity such as coughing, sneezing, laughing or exercise?   yes   11. Have you tried medications to help improve your symptoms?   no   12. Would you be interested in learning about a long-lasting option that may help you with your symptoms?   no                                                                             Total Score   10     0-7 (Mild) 8-16 (Moderate) 17-28 (Severe)        Post void residual bladder scan:   14 cc     Objective     Physical Exam:   Vital Signs:   Vitals:    07/16/25 1115   BP: 123/83   Pulse: 93   SpO2: 98%     There is no height or weight on file to calculate BMI.     Physical Exam  Vitals and nursing note reviewed.   Constitutional:       Appearance: Normal appearance.   Pulmonary:      Effort: Pulmonary effort is normal.   Neurological:      Mental Status: She is alert and oriented to person, place, and time.   Psychiatric:         Mood and Affect: Mood normal.         Behavior: Behavior normal.         Labs:   Brief Urine Lab Results  (Last result in the past 365 days)        Color   Clarity   Blood   Leuk Est   Nitrite   Protein   CREAT   Urine HCG        07/16/25 1121 Yellow   Clear   Trace   Negative   Negative   Negative                        Lab Results   Component Value Date    GLUCOSE 66 04/03/2025    CALCIUM 9.3 04/03/2025     04/03/2025    K 4.7 04/03/2025    CO2 25.2 04/03/2025     04/03/2025    BUN 16 04/03/2025    CREATININE 0.98 04/03/2025    BCR  16.3 04/03/2025    ANIONGAP 8.8 04/03/2025       Lab Results   Component Value Date    WBC 5.44 04/03/2025    HGB 13.6 04/03/2025    HCT 40.2 04/03/2025    MCV 93.3 04/03/2025     04/03/2025       Images:   No Images in the past 120 days found..    Measures:   Tobacco:   Yue Jernigan  reports that she has never smoked. She has never been exposed to tobacco smoke. She has never used smokeless tobacco.        Urine Incontinence: Patient reports that she is not currently experiencing any symptoms of urinary incontinence.       Assessment / Plan      Assessment/Plan:   Ms. Yue Jernigan is a 55 y.o. female who presents today for asymptomatic microscopic hematuria. Patient underwent negative microscopic hematuria workup with Dr. Boss in December of 2024. Denies any new concerns regarding hematuria. Patient prescribed vaginal estrogen cream for vaginal dryness and dyspareunia. She reports minimal symptom improvement with externally applying vaginal cream twice weekly. We discussed increasing dose of vaginal estrogen cream to aid in symptom improvement. Patient is understanding and agreeable plan of care.  Will follow-up in 6 months for symptom check.        Diagnoses and all orders for this visit:    1. Hematuria, unspecified type (Primary)  -     POC Urinalysis Dipstick, Automated    2. Microscopic hematuria  -     Urinalysis With Microscopic - Urine, Clean Catch; Future  -     Urinalysis With Microscopic - Urine, Clean Catch    3. Vaginal atrophy  -     estradiol (ESTRACE) 0.1 MG/GM vaginal cream; Insert 2 g into the vagina Daily. Insert 2 g of vaginal estrogen cream nightly for 2 weeks, then transition to inserting 1 g of vaginal Estrace 2-3 times per week for vaginal dryness and dyspareunia.  Dispense: 42.5 g; Refill: 6    4. Dyspareunia in female  -     estradiol (ESTRACE) 0.1 MG/GM vaginal cream; Insert 2 g into the vagina Daily. Insert 2 g of vaginal estrogen cream nightly for 2 weeks, then  transition to inserting 1 g of vaginal Estrace 2-3 times per week for vaginal dryness and dyspareunia.  Dispense: 42.5 g; Refill: 6         Follow Up:   Return in about 6 months (around 1/16/2026) for Next scheduled follow up.    I spent approximately 30 minutes providing clinical care for this patient; including review of patient's chart and provider documentation, face to face time spent with patient in examination room (obtaining history, performing physical exam, discussing diagnosis and management options), placing orders, and completing patient documentation.     BREANNA Lewis  [facility] [place]

## 2025-07-17 LAB
BACTERIA UR QL AUTO: NORMAL /HPF
HYALINE CASTS UR QL AUTO: NORMAL /LPF
RBC # UR STRIP: NORMAL /HPF
REF LAB TEST METHOD: NORMAL
SQUAMOUS #/AREA URNS HPF: NORMAL /HPF
WBC # UR STRIP: NORMAL /HPF

## (undated) DEVICE — BLD SCLPL BP SS SZ15

## (undated) DEVICE — NDL HYPO SURE/SNAP SFTY 22G 1.5IN LF

## (undated) DEVICE — GLV SURG DERMASSURE GRN LF PF 7.0

## (undated) DEVICE — ST TBG IRR BLDR CYSTO 80IN

## (undated) DEVICE — SYR CONTRL PRESS/LO FIX/M/LL W/THMB/RNG 10ML

## (undated) DEVICE — SCRB SURG BACTOSHIELD CHG 4PCT 4OZ

## (undated) DEVICE — PK D AND C CONE 10

## (undated) DEVICE — SUT VIC 2/0 CT2 27IN J269H